# Patient Record
Sex: FEMALE | Race: BLACK OR AFRICAN AMERICAN | URBAN - METROPOLITAN AREA
[De-identification: names, ages, dates, MRNs, and addresses within clinical notes are randomized per-mention and may not be internally consistent; named-entity substitution may affect disease eponyms.]

---

## 2017-01-12 ENCOUNTER — TELEPHONE (OUTPATIENT)
Dept: FAMILY MEDICINE CLINIC | Facility: CLINIC | Age: 32
End: 2017-01-12

## 2017-01-12 NOTE — TELEPHONE ENCOUNTER
Pt informed regarding Dr Sarah Oden response below. Pt states \"this is ridiculous\" but states she will go back to ER then.     Staff to f/u tomorrow

## 2017-01-12 NOTE — TELEPHONE ENCOUNTER
If there is sustained breathing issues then she needs urgent or ER care. This facility does not manage acute heart and lung problems.

## 2017-01-12 NOTE — TELEPHONE ENCOUNTER
Pt verifies info below; was in [place] ER 12/23 and [facility] ER 12/21. States was on steroids and nebulizer for breathing issues; also taking coughing pills unsure of name.  States breathing had improved but \"seems to be acting up\" past 2 days, and

## 2017-01-12 NOTE — TELEPHONE ENCOUNTER
Pt called in stating the hospital prescribed pt a nebulizer for home, but she is still having troubles breathing and has issues with her lungs. Pt is looking to make an appt with Dr. Jon Gates.   Pt also stating she has pains underneath her shoulder blades and s

## 2017-01-13 NOTE — TELEPHONE ENCOUNTER
Pt states she didn't go to ER as advised then disconnected call.     LMTCB, transfer to [facility] ext [phone]

## 2017-01-13 NOTE — TELEPHONE ENCOUNTER
If she has acute resilient airway issues she needs to go to ER. Again this is not an urgent care facility.

## 2017-01-17 NOTE — TELEPHONE ENCOUNTER
VALERIA De La Cruz we have made several attempt to reach pt no answer. Left a detailed message that the office has made several attempts to reach her if she needs a Ov she can contact  office at  [phone].  Thanks  [facility] ext [phone]   NO ER visit noted

## 2017-02-17 ENCOUNTER — OFFICE VISIT (OUTPATIENT)
Dept: FAMILY MEDICINE CLINIC | Facility: CLINIC | Age: 32
End: 2017-02-17

## 2017-02-17 VITALS
HEART RATE: 96 BPM | WEIGHT: 176 LBS | BODY MASS INDEX: 28.28 KG/M2 | TEMPERATURE: 99 F | RESPIRATION RATE: 16 BRPM | SYSTOLIC BLOOD PRESSURE: 110 MMHG | DIASTOLIC BLOOD PRESSURE: 66 MMHG | HEIGHT: 66 IN

## 2017-02-17 DIAGNOSIS — S63.502A LEFT WRIST SPRAIN, INITIAL ENCOUNTER: Primary | ICD-10-CM

## 2017-02-17 DIAGNOSIS — S63.622A SPRAIN OF INTERPHALANGEAL JOINT OF LEFT THUMB, INITIAL ENCOUNTER: ICD-10-CM

## 2017-02-17 PROBLEM — S63.602A SPRAIN OF HAND, THUMB, LEFT: Status: ACTIVE | Noted: 2017-02-17

## 2017-02-17 PROCEDURE — 99212 OFFICE O/P EST SF 10 MIN: CPT | Performed by: FAMILY MEDICINE

## 2017-02-17 PROCEDURE — 99213 OFFICE O/P EST LOW 20 MIN: CPT | Performed by: FAMILY MEDICINE

## 2017-02-17 RX ORDER — METHYLPREDNISOLONE 4 MG/1
TABLET ORAL
Qty: 1 KIT | Refills: 0 | Status: SHIPPED | OUTPATIENT
Start: 2017-02-17 | End: 2017-03-02 | Stop reason: ALTCHOICE
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
## 2017-02-17 NOTE — PATIENT INSTRUCTIONS
Consider ortho. Medrol prescribed. Consider splint left hand wrist. Probable release to work after 1 week of treatment and reassessment.

## 2017-02-21 ENCOUNTER — TELEPHONE (OUTPATIENT)
Dept: FAMILY MEDICINE CLINIC | Facility: CLINIC | Age: 32
End: 2017-02-21

## 2017-02-22 NOTE — TELEPHONE ENCOUNTER
Apolonia Martinez from Claims [employer] calling and requesting to speak with RN directly about patients restrictions at work     Apolonia Martinez is requesting call back ASAPCharissa    [facility]

## 2017-02-23 ENCOUNTER — TELEPHONE (OUTPATIENT)
Dept: FAMILY MEDICINE CLINIC | Facility: CLINIC | Age: 32
End: 2017-02-23

## 2017-02-23 NOTE — TELEPHONE ENCOUNTER
Pt stts at last office visit she was instructed to come in tomorrow 2/24 for a follow up regarding WC. Pt stts the  was to call her to let her know what time she can come in. .  Pt did not receive a call back, Dr. Joan Cooper schedule is booked.   Okay to

## 2017-02-23 NOTE — TELEPHONE ENCOUNTER
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 Di Older states that they can place her as a  or at the desk answering phones if doctor Manuelito Baptiste is ok with that.  Will fax another form for

## 2017-02-23 NOTE — TELEPHONE ENCOUNTER
I did fill out some forms on her visit. If they have work that can be done with her right hand then fine she can work.

## 2017-02-27 NOTE — TELEPHONE ENCOUNTER
Pt following up on appt question.   Please advise per marlys shultz to book for Thursday with Dr. Catina Agudelo

## 2017-03-01 NOTE — TELEPHONE ENCOUNTER
Claims management asking if could speak to nurse or Dr. Ferrer Pulse states he received the work form (Initial work status), and is confused, has a few questions. Please advise.

## 2017-03-02 ENCOUNTER — OFFICE VISIT (OUTPATIENT)
Dept: FAMILY MEDICINE CLINIC | Facility: CLINIC | Age: 32
End: 2017-03-02

## 2017-03-02 ENCOUNTER — LAB ENCOUNTER (OUTPATIENT)
Dept: LAB | Age: 32
End: 2017-03-02
Attending: FAMILY MEDICINE
Payer: MEDICAID

## 2017-03-02 VITALS
HEART RATE: 111 BPM | HEIGHT: 66 IN | TEMPERATURE: 99 F | DIASTOLIC BLOOD PRESSURE: 76 MMHG | RESPIRATION RATE: 20 BRPM | SYSTOLIC BLOOD PRESSURE: 152 MMHG

## 2017-03-02 VITALS
TEMPERATURE: 99 F | RESPIRATION RATE: 19 BRPM | HEIGHT: 66 IN | HEART RATE: 110 BPM | SYSTOLIC BLOOD PRESSURE: 152 MMHG | DIASTOLIC BLOOD PRESSURE: 76 MMHG

## 2017-03-02 DIAGNOSIS — M54.6 CHRONIC LEFT-SIDED THORACIC BACK PAIN: ICD-10-CM

## 2017-03-02 DIAGNOSIS — S63.92XD SPRAIN OF LEFT HAND, SUBSEQUENT ENCOUNTER: Primary | ICD-10-CM

## 2017-03-02 DIAGNOSIS — S63.502D SPRAIN OF LEFT WRIST, SUBSEQUENT ENCOUNTER: ICD-10-CM

## 2017-03-02 DIAGNOSIS — R20.0 NUMBNESS OF UPPER EXTREMITY: Primary | ICD-10-CM

## 2017-03-02 DIAGNOSIS — R53.83 FATIGUE, UNSPECIFIED TYPE: ICD-10-CM

## 2017-03-02 DIAGNOSIS — R51.9 HEADACHE, UNSPECIFIED HEADACHE TYPE: ICD-10-CM

## 2017-03-02 DIAGNOSIS — G89.29 CHRONIC LEFT-SIDED THORACIC BACK PAIN: ICD-10-CM

## 2017-03-02 DIAGNOSIS — M99.01 CERVICOTHORACIC SOMATIC DYSFUNCTION: ICD-10-CM

## 2017-03-02 LAB
ALBUMIN SERPL BCP-MCNC: 4.2 G/DL (ref 3.5–4.8)
ALBUMIN/GLOB SERPL: 1.3 {RATIO} (ref 1–2)
ALP SERPL-CCNC: 61 U/L (ref 32–100)
ALT SERPL-CCNC: 15 U/L (ref 14–54)
ANION GAP SERPL CALC-SCNC: 7 MMOL/L (ref 0–18)
AST SERPL-CCNC: 16 U/L (ref 15–41)
BASOPHILS # BLD: 0.1 K/UL (ref 0–0.2)
BASOPHILS NFR BLD: 1 %
BILIRUB SERPL-MCNC: 0.7 MG/DL (ref 0.3–1.2)
BUN SERPL-MCNC: 12 MG/DL (ref 8–20)
BUN/CREAT SERPL: 15 (ref 10–20)
CALCIUM SERPL-MCNC: 9.7 MG/DL (ref 8.5–10.5)
CHLORIDE SERPL-SCNC: 106 MMOL/L (ref 95–110)
CO2 SERPL-SCNC: 26 MMOL/L (ref 22–32)
CREAT SERPL-MCNC: 0.8 MG/DL (ref 0.5–1.5)
EOSINOPHIL # BLD: 0.2 K/UL (ref 0–0.7)
EOSINOPHIL NFR BLD: 3 %
ERYTHROCYTE [DISTWIDTH] IN BLOOD BY AUTOMATED COUNT: 13.8 % (ref 11–15)
GLOBULIN PLAS-MCNC: 3.3 G/DL (ref 2.5–3.7)
GLUCOSE SERPL-MCNC: 66 MG/DL (ref 70–99)
HCT VFR BLD AUTO: 39.5 % (ref 35–48)
HGB BLD-MCNC: 13 G/DL (ref 12–16)
LYMPHOCYTES # BLD: 3.5 K/UL (ref 1–4)
LYMPHOCYTES NFR BLD: 49 %
MCH RBC QN AUTO: 32.5 PG (ref 27–32)
MCHC RBC AUTO-ENTMCNC: 33 G/DL (ref 32–37)
MCV RBC AUTO: 98.5 FL (ref 80–100)
MONOCYTES # BLD: 0.8 K/UL (ref 0–1)
MONOCYTES NFR BLD: 11 %
NEUTROPHILS # BLD AUTO: 2.6 K/UL (ref 1.8–7.7)
NEUTROPHILS NFR BLD: 37 %
OSMOLALITY UR CALC.SUM OF ELEC: 286 MOSM/KG (ref 275–295)
PLATELET # BLD AUTO: 338 K/UL (ref 140–400)
PMV BLD AUTO: 8.3 FL (ref 7.4–10.3)
POTASSIUM SERPL-SCNC: 3.9 MMOL/L (ref 3.3–5.1)
PROT SERPL-MCNC: 7.5 G/DL (ref 5.9–8.4)
RBC # BLD AUTO: 4.01 M/UL (ref 3.7–5.4)
SODIUM SERPL-SCNC: 139 MMOL/L (ref 136–144)
TSH SERPL-ACNC: 0.4 UIU/ML (ref 0.34–5.6)
WBC # BLD AUTO: 7.2 K/UL (ref 4–11)

## 2017-03-02 PROCEDURE — 98927 OSTEOPATH MANJ 5-6 REGIONS: CPT | Performed by: FAMILY MEDICINE

## 2017-03-02 PROCEDURE — 85025 COMPLETE CBC W/AUTO DIFF WBC: CPT

## 2017-03-02 PROCEDURE — 99212 OFFICE O/P EST SF 10 MIN: CPT | Performed by: FAMILY MEDICINE

## 2017-03-02 PROCEDURE — 84443 ASSAY THYROID STIM HORMONE: CPT

## 2017-03-02 PROCEDURE — 99214 OFFICE O/P EST MOD 30 MIN: CPT | Performed by: FAMILY MEDICINE

## 2017-03-02 PROCEDURE — 80053 COMPREHEN METABOLIC PANEL: CPT

## 2017-03-02 PROCEDURE — 36415 COLL VENOUS BLD VENIPUNCTURE: CPT

## 2017-03-02 RX ORDER — ALBUTEROL SULFATE 2.5 MG/3ML
SOLUTION RESPIRATORY (INHALATION)
Refills: 0 | COMMUNITY
Start: 2016-12-23 | End: 2017-06-09

## 2017-03-02 NOTE — PATIENT INSTRUCTIONS
Return to work with restrictions 03/03/17. No left hand use. To ortho/hand.  Continue with immobilization of left wrist.

## 2017-03-02 NOTE — TELEPHONE ENCOUNTER
Forms completed at OV 3.2.17. Updated forms have been faxed. No further action needed. Spoke to Chantale about work restrictions from 2.17.18. Per FJR those are the restrictions that he listed for the work she described.

## 2017-03-02 NOTE — PROCEDURES
Multiple vertebral segments in cervical and thoracic region misaligned. Manual osteopathic manipulative therapy performed and immediate relief obtained. Patient instantaneously improved.
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
## 2017-03-02 NOTE — PATIENT INSTRUCTIONS
Cervical spine xray ordered. TSH, CBC, CMP prescribed. May need ortho and/or neurology. Consider medrol. OMT done.

## 2017-03-03 NOTE — PROGRESS NOTES
HPI:    Patient ID: Ciara Flowers is a 29year old female. Numbness  This is a new problem. The current episode started more than 1 month ago. The problem occurs intermittently. The problem has been waxing and waning.  Associated symptoms include headach This is a chronic problem. The current episode started more than 1 month ago. The problem occurs constantly. The problem has been waxing and waning since onset. The pain is present in the thoracic spine. The quality of the pain is described as aching.  The PHYSICAL EXAM:   Physical Exam    Constitutional: She is oriented to person, place, and time. She appears distressed.    HENT:   Head:       Right Ear: Tympanic membrane and ear canal normal.   Left Ear: Tympanic membrane and ear canal normal.   Nose: Nose Return in about 3 weeks (around 3/23/2017), or if symptoms worsen or fail to improve.          VC#[id number]

## 2017-03-10 NOTE — TELEPHONE ENCOUNTER
Cedrick Cook from claims management called to inquire if pt can be offered other work that involves right hand work only. Cedrick Cook said that the restriction for sit down work is not available and they want pt to do other work.  Cedrick Cook was reminded that he deshaun

## 2017-03-10 NOTE — TELEPHONE ENCOUNTER
Rob Ruvalcaba from [employer] called in requesting to speak to an RN or Dr. Solomon Dillon to clarify pt's restrictions for work, please.   Rob Ruvalcaba wants to ask things like if pt can work with her right hand without having to sit down and a few other questions  Ple

## 2017-03-13 ENCOUNTER — TELEPHONE (OUTPATIENT)
Dept: FAMILY MEDICINE CLINIC | Facility: CLINIC | Age: 32
End: 2017-03-13

## 2017-03-13 RX ORDER — CARISOPRODOL 350 MG/1
350 TABLET ORAL 4 TIMES DAILY PRN
Qty: 50 TABLET | Refills: 0 | Status: SHIPPED | OUTPATIENT
Start: 2017-03-13 | End: 2017-03-28

## 2017-03-13 NOTE — TELEPHONE ENCOUNTER
Reason for Call/Chief Complaint: pt was having a spasm and pain under shoulder pain, asking for prescription  Onset: since 3/2 OV  Nursing Assessment/Associated Symptoms: Pt states she had a manipulation done at 3/2/17 OV and since that time her left arm g

## 2017-03-13 NOTE — TELEPHONE ENCOUNTER
Patient reports that she was seen in office a few days ago and has been experiencing muscle spasms in back and shoulder. She reports that since her last appt, she has been experiencing increased muscle spasms and pain in this same area.  Please call [phone]

## 2017-03-14 ENCOUNTER — TELEPHONE (OUTPATIENT)
Dept: ORTHOPEDICS CLINIC | Facility: CLINIC | Age: 32
End: 2017-03-14

## 2017-03-14 NOTE — TELEPHONE ENCOUNTER
Call to [facility] injury March 7th. Works [employer] at [employer]. Pulled something by thumb and thumb gets stuck. Significant pain. Referred by Dr. Ashvin Coronel.  Appointment made for Thursday 16th

## 2017-03-14 NOTE — TELEPHONE ENCOUNTER
Pt states she sprained her L hand, states she is in pain, requesting to be seen on 3/16. Pls advise thank you.

## 2017-03-14 NOTE — TELEPHONE ENCOUNTER
Pt was inform of  message below. Prescription will be faxed by staff.  pt stated that she needs something for the pain. She stated that she took  4 tablets of Ibuprofen of 800 mg.  She stated that her mom is giving it to her but it still does

## 2017-03-15 ENCOUNTER — HOSPITAL ENCOUNTER (OUTPATIENT)
Dept: GENERAL RADIOLOGY | Age: 32
Discharge: HOME | End: 2017-03-15
Attending: FAMILY MEDICINE
Payer: MEDICAID

## 2017-03-15 DIAGNOSIS — R20.0 NUMBNESS OF UPPER EXTREMITY: ICD-10-CM

## 2017-03-15 PROCEDURE — 72050 X-RAY EXAM NECK SPINE 4/5VWS: CPT

## 2017-03-16 ENCOUNTER — HOSPITAL ENCOUNTER (OUTPATIENT)
Dept: GENERAL RADIOLOGY | Facility: HOSPITAL | Age: 32
Discharge: HOME | End: 2017-03-16
Attending: ORTHOPAEDIC SURGERY
Payer: MEDICAID

## 2017-03-16 ENCOUNTER — OFFICE VISIT (OUTPATIENT)
Dept: ORTHOPEDICS CLINIC | Facility: CLINIC | Age: 32
End: 2017-03-16

## 2017-03-16 ENCOUNTER — OFFICE VISIT (OUTPATIENT)
Dept: PHYSICAL THERAPY | Age: 32
End: 2017-03-16
Attending: ORTHOPAEDIC SURGERY
Payer: MEDICAID

## 2017-03-16 DIAGNOSIS — S63.8X2A: Primary | ICD-10-CM

## 2017-03-16 DIAGNOSIS — M25.532 LEFT WRIST PAIN: ICD-10-CM

## 2017-03-16 DIAGNOSIS — M25.532 LEFT WRIST PAIN: Primary | ICD-10-CM

## 2017-03-16 DIAGNOSIS — S63.8X2A: ICD-10-CM

## 2017-03-16 PROBLEM — S63.8X9A: Status: ACTIVE | Noted: 2017-03-16

## 2017-03-16 PROBLEM — M25.531 RIGHT WRIST PAIN: Status: ACTIVE | Noted: 2017-03-16

## 2017-03-16 PROCEDURE — 99243 OFF/OP CNSLTJ NEW/EST LOW 30: CPT | Performed by: ORTHOPAEDIC SURGERY

## 2017-03-16 PROCEDURE — 73110 X-RAY EXAM OF WRIST: CPT

## 2017-03-16 PROCEDURE — 99212 OFFICE O/P EST SF 10 MIN: CPT | Performed by: ORTHOPAEDIC SURGERY

## 2017-03-16 PROCEDURE — 97760 ORTHOTIC MGMT&TRAING 1ST ENC: CPT | Performed by: OCCUPATIONAL THERAPIST

## 2017-03-16 RX ORDER — NAPROXEN 500 MG/1
500 TABLET ORAL 2 TIMES DAILY
Qty: 60 TABLET | Refills: 0 | Status: SHIPPED | OUTPATIENT
Start: 2017-03-16 | End: 2017-04-29

## 2017-03-16 RX ORDER — HYDROCODONE BITARTRATE AND ACETAMINOPHEN 5; 325 MG/1; MG/1
1 TABLET ORAL EVERY 4 HOURS PRN
Qty: 30 TABLET | Refills: 0 | Status: SHIPPED | OUTPATIENT
Start: 2017-03-16 | End: 2017-04-29

## 2017-03-16 NOTE — PROGRESS NOTES
SPLINT EVALUATION:   Referring Physician: Dr. Safia Dupree  Diagnosis: Left wrist pain (M25.532)  Carpometacarpal sprain, left, initial encounter (D29.4I0G) Date of Service: 3/16/2017   Date of Onset: 2/7/17      PATIENT SUMMARY   Wilda Nieto is a 29 year o possible to [phone].  If you have any questions, please contact me at Dept: [phone]    Sincerely,  Electronically signed by therapist: Christina Miller OT    [de-identified] certification required: Yes  I certify the need for these services furnished u

## 2017-03-20 ENCOUNTER — TELEPHONE (OUTPATIENT)
Dept: FAMILY MEDICINE CLINIC | Facility: CLINIC | Age: 32
End: 2017-03-20

## 2017-03-20 DIAGNOSIS — N83.209 CYST OF OVARY, UNSPECIFIED LATERALITY: Primary | ICD-10-CM

## 2017-03-20 NOTE — TELEPHONE ENCOUNTER
Notes Recorded by Jatin Monte DO on 3/15/2017 at 2:29 PM  Xray of neck suggestive of muscle spasm.  Bone structures are all intact

## 2017-03-21 ENCOUNTER — TELEPHONE (OUTPATIENT)
Dept: FAMILY MEDICINE CLINIC | Facility: CLINIC | Age: 32
End: 2017-03-21

## 2017-03-21 DIAGNOSIS — R22.1 SENSATION OF LUMP IN THROAT: Primary | ICD-10-CM

## 2017-03-21 NOTE — TELEPHONE ENCOUNTER
Pt was inform of  message below. Pt stated that she will call them and see when they have a appt available. Pt then stated that  she will go to ER as she continues to feel the pain and pressure. Her mother will take her.     ER f/u 3/22

## 2017-03-21 NOTE — TELEPHONE ENCOUNTER
Pt was inform of   message below and pt verbalized understanding. VALERIA De La Cruz Pt also mentioned to me that she had been in the [facility]  ER last night due to lower left abd pain and feeling pressure on her rectal area.  Pt stated that they did a u

## 2017-03-21 NOTE — TELEPHONE ENCOUNTER
She needs gyne. She does not need an appointment with me. If I refer her then shouldn't her insurance be accepted. Referral on chart. Call patient.

## 2017-03-22 NOTE — TELEPHONE ENCOUNTER
I spoke to pt and she stated that she did go to ER put it was very crowded and she was inform it was a four hour wait. Pt left the ER.  Pt stated that she is still having pain but she will call the GYN right now to see if she can get in to see someone today

## 2017-03-24 ENCOUNTER — TELEPHONE (OUTPATIENT)
Dept: FAMILY MEDICINE CLINIC | Facility: CLINIC | Age: 32
End: 2017-03-24

## 2017-03-27 ENCOUNTER — TELEPHONE (OUTPATIENT)
Dept: FAMILY MEDICINE CLINIC | Facility: CLINIC | Age: 32
End: 2017-03-27

## 2017-03-27 NOTE — TELEPHONE ENCOUNTER
Pt. Called requesting a refill for muscle relaxers. Please advise. Carisoprodol (SOMA) 350 MG Oral Tab Take 1 tablet (350 mg total) by mouth 4 (four) times daily as needed for Muscle spasms.  Disp: 50 tablet Rfl: 0

## 2017-03-28 RX ORDER — CARISOPRODOL 350 MG/1
350 TABLET ORAL 4 TIMES DAILY PRN
Qty: 50 TABLET | Refills: 0 | Status: SHIPPED | OUTPATIENT
Start: 2017-03-28 | End: 2017-04-29

## 2017-03-28 NOTE — TELEPHONE ENCOUNTER
Notified pt prescription has been refilled and will be faxed to pharmacy, pt would like prescription faxed to [facility].

## 2017-03-28 NOTE — TELEPHONE ENCOUNTER
Call transferred to RN from [address] [place] [address]. Pt states pharmacist could not read faxed prescription and need to have it called into [facility]. Carisoprodol prescription called into pharmacist, notified pt. Pt had no further questions at this time.

## 2017-03-28 NOTE — TELEPHONE ENCOUNTER
Olaf [phone] states that she just received a fax for the soma medication. She is not able to read it and would like a verbal given or can it be refaxed?

## 2017-03-28 NOTE — TELEPHONE ENCOUNTER
Requesting Carisoprodol refill    Last filled 3/13/17  Last OV 3/2/17    Please advise    Notified pt refill request forwarded to on call doctor.

## 2017-03-29 ENCOUNTER — TELEPHONE (OUTPATIENT)
Dept: FAMILY MEDICINE CLINIC | Facility: CLINIC | Age: 32
End: 2017-03-29

## 2017-03-29 RX ORDER — CARISOPRODOL 350 MG/1
TABLET ORAL
Qty: 50 TABLET | Refills: 0 | OUTPATIENT
Start: 2017-03-29

## 2017-03-29 NOTE — TELEPHONE ENCOUNTER
----- Message from Chucky De Los Santos DO sent at 3/15/2017  2:29 PM CDT -----  Xray of neck suggestive of muscle spasm. Bone structures are all intact.

## 2017-03-31 NOTE — TELEPHONE ENCOUNTER
Pt returned call, verified pt name and . Reviewed Xray results with pt per doctor's instructions. Pt had no further questions at this time.

## 2017-04-03 RX ORDER — HYDROCODONE BITARTRATE AND ACETAMINOPHEN 10; 325 MG/1; MG/1
1 TABLET ORAL EVERY 4 HOURS PRN
Qty: 40 TABLET | Refills: 0 | Status: SHIPPED | OUTPATIENT
Start: 2017-04-03 | End: 2017-04-29

## 2017-04-03 NOTE — TELEPHONE ENCOUNTER
I will fill this prescription only one time. I will not fill this again if this becomes a chronic problem. She will have to get her pain medication from her gyne specialist. LET THE PATIENT KNOW THIS SO THEE WILL BE NO SURPRISES LATER.  Prescription signed

## 2017-04-03 NOTE — TELEPHONE ENCOUNTER
Dr. Hayden Simpler, please see message below and advise. Thank you    Pt states was in the hospital d/t Left ovarian cyst on and now has Right ovarian cyst, has seen Ob/gyne regarding cysts.  States was given Hydrocodone by OB/gyne but was informed to ask pcp for

## 2017-04-04 NOTE — TELEPHONE ENCOUNTER
LMTCB, transfer to [facility] ext [phone]    Prescription placed in folder in locked file drawer.

## 2017-04-04 NOTE — TELEPHONE ENCOUNTER
Pt returned call. Informed pt prescription ready for  at [facility] and reviewed doctor's recommendations with pt.  Pt states she has an ongoing issue with pain from ovarian cysts and endometriosis and OB/GYN informed her nothing can be done about this and

## 2017-04-05 NOTE — TELEPHONE ENCOUNTER
Reviewed doctor's recommendations with pt, pt agreed with plan of care and will have OB/GYN fax notes to Dr. Xavier Bolaños, provided pt with office fax number. Pt had no further questions at this time.

## 2017-04-11 ENCOUNTER — TELEPHONE (OUTPATIENT)
Dept: FAMILY MEDICINE CLINIC | Facility: CLINIC | Age: 32
End: 2017-04-11

## 2017-04-11 NOTE — TELEPHONE ENCOUNTER
Pt calling requesting to speak to nurse would not provide more information, states personal matter. Please advise.

## 2017-04-13 ENCOUNTER — TELEPHONE (OUTPATIENT)
Dept: ORTHOPEDICS CLINIC | Facility: CLINIC | Age: 32
End: 2017-04-13

## 2017-04-18 NOTE — TELEPHONE ENCOUNTER
LMTCB, transfer to [facility] ext [phone]. No phone response letter sent to home address on file.

## 2017-04-27 RX ORDER — HYDROCODONE BITARTRATE AND ACETAMINOPHEN 10; 325 MG/1; MG/1
1 TABLET ORAL EVERY 4 HOURS PRN
Qty: 40 TABLET | Refills: 0 | OUTPATIENT
Start: 2017-04-27

## 2017-04-27 RX ORDER — CARISOPRODOL 350 MG/1
350 TABLET ORAL 4 TIMES DAILY PRN
Qty: 50 TABLET | Refills: 0 | OUTPATIENT
Start: 2017-04-27

## 2017-04-27 NOTE — TELEPHONE ENCOUNTER
Call transferred to RN from [address] [place] [address]. Pt states she has an OB/GYN appt now and will have the office fax a note to Dr. Xavier Bolaños stating pt has endometriosis.     Pt requesting Carisoprodol and Hydrocodone-acetaminophen refills    Hydrocodone last filled 4/3/17  C

## 2017-04-27 NOTE — TELEPHONE ENCOUNTER
Pt states she is on her way to OB/Gyne office and needed fax so they can fax note to Dr. Delaney Person stating she had endometriosis. Fax number provided.

## 2017-04-29 ENCOUNTER — OFFICE VISIT (OUTPATIENT)
Dept: FAMILY MEDICINE CLINIC | Facility: CLINIC | Age: 32
End: 2017-04-29

## 2017-04-29 ENCOUNTER — TELEPHONE (OUTPATIENT)
Dept: FAMILY MEDICINE CLINIC | Facility: CLINIC | Age: 32
End: 2017-04-29

## 2017-04-29 VITALS
DIASTOLIC BLOOD PRESSURE: 73 MMHG | RESPIRATION RATE: 16 BRPM | WEIGHT: 181 LBS | SYSTOLIC BLOOD PRESSURE: 111 MMHG | TEMPERATURE: 99 F | BODY MASS INDEX: 29 KG/M2 | HEART RATE: 99 BPM

## 2017-04-29 DIAGNOSIS — G89.29 CHRONIC LOW BACK PAIN WITHOUT SCIATICA, UNSPECIFIED BACK PAIN LATERALITY: ICD-10-CM

## 2017-04-29 DIAGNOSIS — N80.9 ENDOMETRIOSIS: ICD-10-CM

## 2017-04-29 DIAGNOSIS — R51.9 NONINTRACTABLE EPISODIC HEADACHE, UNSPECIFIED HEADACHE TYPE: ICD-10-CM

## 2017-04-29 DIAGNOSIS — K62.9 ANAL LESION: ICD-10-CM

## 2017-04-29 DIAGNOSIS — K62.5 ANAL BLEEDING: Primary | ICD-10-CM

## 2017-04-29 DIAGNOSIS — M54.50 CHRONIC LOW BACK PAIN WITHOUT SCIATICA, UNSPECIFIED BACK PAIN LATERALITY: ICD-10-CM

## 2017-04-29 PROCEDURE — 99212 OFFICE O/P EST SF 10 MIN: CPT | Performed by: FAMILY MEDICINE

## 2017-04-29 PROCEDURE — 99214 OFFICE O/P EST MOD 30 MIN: CPT | Performed by: FAMILY MEDICINE

## 2017-04-29 RX ORDER — HYDROCODONE BITARTRATE AND ACETAMINOPHEN 10; 325 MG/1; MG/1
1 TABLET ORAL EVERY 4 HOURS PRN
Qty: 40 TABLET | Refills: 0 | OUTPATIENT
Start: 2017-04-29

## 2017-04-29 RX ORDER — CARISOPRODOL 350 MG/1
350 TABLET ORAL 4 TIMES DAILY PRN
Qty: 50 TABLET | Refills: 0 | Status: SHIPPED | OUTPATIENT
Start: 2017-04-29 | End: 2017-06-09

## 2017-04-29 RX ORDER — FLUTICASONE PROPIONATE 50 MCG
SPRAY, SUSPENSION (ML) NASAL DAILY
COMMUNITY
End: 2017-06-09

## 2017-04-29 RX ORDER — CARISOPRODOL 350 MG/1
350 TABLET ORAL 4 TIMES DAILY PRN
Qty: 50 TABLET | Refills: 0 | OUTPATIENT
Start: 2017-04-29

## 2017-04-29 RX ORDER — HYDROCODONE BITARTRATE AND ACETAMINOPHEN 10; 325 MG/1; MG/1
1 TABLET ORAL EVERY 4 HOURS PRN
Qty: 40 TABLET | Refills: 0 | Status: SHIPPED | OUTPATIENT
Start: 2017-04-29 | End: 2017-06-09

## 2017-04-29 RX ORDER — LORATADINE 10 MG/1
10 TABLET ORAL DAILY
COMMUNITY
End: 2017-06-09

## 2017-04-29 NOTE — PATIENT INSTRUCTIONS
Advised serum herpes testing (patient agreed). Medication reviewed and renewed where needed and appropriate. Pain management via prescription medications as needed.   Patient counseled on the importance of abstinence and if sex occurs of any type condoms sh

## 2017-04-29 NOTE — TELEPHONE ENCOUNTER
Pt stts that she has endometriosis and stts that she is bleeding from her rectum. She is also c/o pressure in the back of her head and dizziness.  She was wanting to see Dr. Jacquelene Severe today but he does not have any openings

## 2017-04-29 NOTE — TELEPHONE ENCOUNTER
Spoke with pt &  req rx ref for gen norco  mg & gen soma 350 mg for her upper back pain. .   Pt stated she has h/o endometriosis, she c/o on & off bump  inside her bottom, occasional bleed & it hurts x 2 week.    She req rx for cream.   Pt stated she c

## 2017-04-30 NOTE — PROGRESS NOTES
HPI:    Patient ID: Amy Harper is a 29year old female. Rectal Bleeding  This is a recurrent (Actually a perianal reoccurring painful sore that leaves blood on the toilet tissue with wiping) problem. The current episode started more than 1 month ago. Rfl:    HYDROcodone-acetaminophen  MG Oral Tab Take 1 tablet by mouth every 4 (four) hours as needed for Pain.  Disp: 40 tablet Rfl: 0   Carisoprodol (SOMA) 350 MG Oral Tab Take 1 tablet (350 mg total) by mouth 4 (four) times daily as needed for Muscl with Reflex to Type 1 and 2 Glycoprotein G-Specific Ab, IgG [E]    Meds This Visit:  Signed Prescriptions Disp Refills    HYDROcodone-acetaminophen  MG Oral Tab 40 tablet 0      Sig: Take 1 tablet by mouth every 4 (four) hours as needed for Pain.

## 2017-05-09 DIAGNOSIS — G89.29 OTHER CHRONIC PAIN: Primary | ICD-10-CM

## 2017-05-09 DIAGNOSIS — N80.9 ENDOMETRIOSIS: ICD-10-CM

## 2017-05-09 RX ORDER — CARISOPRODOL 350 MG/1
350 TABLET ORAL 4 TIMES DAILY PRN
Qty: 50 TABLET | Refills: 0 | OUTPATIENT
Start: 2017-05-09

## 2017-05-09 RX ORDER — HYDROCODONE BITARTRATE AND ACETAMINOPHEN 10; 325 MG/1; MG/1
1 TABLET ORAL EVERY 4 HOURS PRN
Qty: 40 TABLET | Refills: 0 | OUTPATIENT
Start: 2017-05-09

## 2017-05-09 NOTE — TELEPHONE ENCOUNTER
Pt requesting Hydrocodone-acetaminophen and Carisoprodol refills    Last filled 4/29/17  Last OV 4/29/17; no f/u appt scheduled    Pleas advise

## 2017-05-09 NOTE — TELEPHONE ENCOUNTER
Patient calling to request refills on the following medications:    Current Outpatient Prescriptions:  HYDROcodone-acetaminophen  MG Oral Tab Take 1 tablet by mouth every 4 (four) hours as needed for Pain.  Disp: 40 tablet Rfl: 0   Carisoprodol (SOMA)

## 2017-05-12 NOTE — TELEPHONE ENCOUNTER
Pt following up on refill request... please advise pt state that she works two jobs and gets off at 6 all next week

## 2017-05-15 NOTE — TELEPHONE ENCOUNTER
Call transferred to RN from [address] [place] [address]. Pt states she is out of meds as prescription only lasts about a week. Please advise.

## 2017-05-15 NOTE — TELEPHONE ENCOUNTER
No refill. I am not managing chronic pain in this patient. If I refill it then she will have to seek out a new physician for her management.

## 2017-05-16 NOTE — TELEPHONE ENCOUNTER
Referral on chart; call patient.  I am not sure if the physiatrist will address her pain (endometriosis)

## 2017-05-16 NOTE — TELEPHONE ENCOUNTER
Reviewed doctor's message with pt. Pt is asking for referral to a pain specialist.    Please advise.

## 2017-05-17 ENCOUNTER — TELEPHONE (OUTPATIENT)
Dept: FAMILY MEDICINE CLINIC | Facility: CLINIC | Age: 32
End: 2017-05-17

## 2017-05-17 NOTE — TELEPHONE ENCOUNTER
Gerhardt Lin from Claims Management for [employer] called in request for a note of discharge for patient regarding her wrist injury seeing as though she has been seen last regarding this since March and has nothing to follow up on.  Note should sta

## 2017-06-09 ENCOUNTER — OFFICE VISIT (OUTPATIENT)
Dept: FAMILY MEDICINE CLINIC | Facility: CLINIC | Age: 32
End: 2017-06-09

## 2017-06-09 ENCOUNTER — TELEPHONE (OUTPATIENT)
Dept: FAMILY MEDICINE CLINIC | Facility: CLINIC | Age: 32
End: 2017-06-09

## 2017-06-09 VITALS
BODY MASS INDEX: 28.77 KG/M2 | DIASTOLIC BLOOD PRESSURE: 75 MMHG | WEIGHT: 179 LBS | TEMPERATURE: 99 F | HEART RATE: 99 BPM | HEIGHT: 66 IN | SYSTOLIC BLOOD PRESSURE: 119 MMHG

## 2017-06-09 DIAGNOSIS — G89.29 CHRONIC LEFT SHOULDER PAIN: ICD-10-CM

## 2017-06-09 DIAGNOSIS — N80.9 ENDOMETRIOSIS: ICD-10-CM

## 2017-06-09 DIAGNOSIS — J45.20 MILD INTERMITTENT ASTHMA WITHOUT COMPLICATION: ICD-10-CM

## 2017-06-09 DIAGNOSIS — J30.9 ALLERGIC RHINITIS, UNSPECIFIED ALLERGIC RHINITIS TRIGGER, UNSPECIFIED RHINITIS SEASONALITY: ICD-10-CM

## 2017-06-09 DIAGNOSIS — B37.49 CANDIDIASIS OF UROGENITAL SITE: ICD-10-CM

## 2017-06-09 DIAGNOSIS — N39.0 CHRONIC UTI (URINARY TRACT INFECTION): ICD-10-CM

## 2017-06-09 DIAGNOSIS — M25.512 CHRONIC LEFT SHOULDER PAIN: ICD-10-CM

## 2017-06-09 DIAGNOSIS — R10.2 PELVIC PAIN: Primary | ICD-10-CM

## 2017-06-09 PROCEDURE — 99214 OFFICE O/P EST MOD 30 MIN: CPT | Performed by: NURSE PRACTITIONER

## 2017-06-09 RX ORDER — LORATADINE 10 MG/1
10 TABLET ORAL DAILY
Qty: 30 TABLET | Refills: 0 | Status: SHIPPED | OUTPATIENT
Start: 2017-06-09 | End: 2017-07-20

## 2017-06-09 RX ORDER — FLUCONAZOLE 200 MG/1
200 TABLET ORAL ONCE
Qty: 2 TABLET | Refills: 3 | Status: SHIPPED | OUTPATIENT
Start: 2017-06-09 | End: 2017-06-09

## 2017-06-09 RX ORDER — HYDROCODONE BITARTRATE AND ACETAMINOPHEN 10; 325 MG/1; MG/1
1 TABLET ORAL EVERY 4 HOURS PRN
Qty: 40 TABLET | Refills: 0 | Status: SHIPPED | OUTPATIENT
Start: 2017-06-09 | End: 2017-06-23

## 2017-06-09 RX ORDER — IBUPROFEN 800 MG/1
800 TABLET ORAL EVERY 6 HOURS PRN
Qty: 60 TABLET | Refills: 2 | Status: SHIPPED | OUTPATIENT
Start: 2017-06-09 | End: 2017-08-14

## 2017-06-09 RX ORDER — CARISOPRODOL 350 MG/1
350 TABLET ORAL 4 TIMES DAILY PRN
Qty: 50 TABLET | Refills: 0 | Status: SHIPPED | OUTPATIENT
Start: 2017-06-09 | End: 2017-07-06

## 2017-06-09 RX ORDER — FLUTICASONE PROPIONATE 50 MCG
2 SPRAY, SUSPENSION (ML) NASAL DAILY
Qty: 1 BOTTLE | Refills: 3 | Status: SHIPPED | OUTPATIENT
Start: 2017-06-09 | End: 2017-07-09

## 2017-06-09 RX ORDER — NITROFURANTOIN 25; 75 MG/1; MG/1
100 CAPSULE ORAL NIGHTLY
Qty: 30 CAPSULE | Refills: 0 | Status: SHIPPED | OUTPATIENT
Start: 2017-06-09 | End: 2017-08-14

## 2017-06-09 RX ORDER — FLUCONAZOLE 200 MG/1
200 TABLET ORAL ONCE
COMMUNITY
End: 2017-06-09

## 2017-06-09 RX ORDER — ALBUTEROL SULFATE 2.5 MG/3ML
2.5 SOLUTION RESPIRATORY (INHALATION) EVERY 6 HOURS PRN
Qty: 100 VIAL | Refills: 2 | Status: SHIPPED | OUTPATIENT
Start: 2017-06-09 | End: 2017-07-09

## 2017-06-09 RX ORDER — NITROFURANTOIN 25; 75 MG/1; MG/1
100 CAPSULE ORAL NIGHTLY
COMMUNITY
End: 2017-06-09

## 2017-06-09 RX ORDER — MONTELUKAST SODIUM 10 MG/1
10 TABLET ORAL DAILY
Qty: 90 TABLET | Refills: 3 | Status: SHIPPED | OUTPATIENT
Start: 2017-06-09 | End: 2017-08-14

## 2017-06-09 NOTE — TELEPHONE ENCOUNTER
Pt is calling state that she was just in the office to see the [facility] and a prescription for Carisoprodol (SOMA) 350 MG Oral Tab was going to be sent to the pharm and it not there

## 2017-06-09 NOTE — PATIENT INSTRUCTIONS
ALLERGIC RHINITIS    -Take otc allergy medications as directed (over the counter, generic Claritin, Zyrtec or Allegra)    -use of fluticasone nasal spray as advised (Flonase)-this is now available as a generic, over the counter spray (fluticasone) if your fibrous tissue that connect two bones together in your joints. *A muscle strain, or pulled muscle, occurs when your muscle is overstretched or torn.  This usually occurs as a result of fatigue, overuse, or improper use of a muscle        For immediate s heal in three to six weeks. Call if symptoms do not start to improve within 2 weeks, if symptoms do not resolve within 6 weeks or symptoms worsen during this time.      *If any incontinence of urine or stool develops, seek medical attention immediately

## 2017-06-09 NOTE — PROGRESS NOTES
HPI  Pt presents for pelvic and low back pain; had a uterine ablation done 3 years ago and since has developed endometriosis; was seen by two different doctor both recommending hysterectomy or removal of endometriosis.  Gets frequent UTI's imelda after sexual Former Smoker     Smokeless tobacco: Not on file    Alcohol Use: No    Drug Use: No    Sexual Activity: Not on file   Not on file  Other Topics Concern    Caffeine Concern Yes    Comment: soda     Social History Narrative         Current Outpatient Prescri rhinorrhea present; bilat post nasal drip present   Eyes: Conjunctivae and EOM are normal. Pupils are equal, round, and reactive to light. Right eye exhibits no discharge. Left eye exhibits no discharge. Neck: Normal range of motion. Neck supple.  No thyr Dispense:  100 vial   Refill:  2  fluconazole 200 MG Oral Tab   Sig: Take 1 tablet (200 mg total) by mouth once.  Take 1 tablet by mouth; may repeat in 3 days if s/s remain   Dispense:  2 tablet   Refill:  3  Fluticasone Propionate 50 MCG/ACT Nasal Suspensi

## 2017-06-10 ENCOUNTER — TELEPHONE (OUTPATIENT)
Dept: FAMILY MEDICINE CLINIC | Facility: CLINIC | Age: 32
End: 2017-06-10

## 2017-06-10 NOTE — TELEPHONE ENCOUNTER
Spoke with pt and identified that this was the office of Dr. Nargis Harvey and the pt states that she did not want Dr. Erin Benedict office and she disconnected the call.

## 2017-06-10 NOTE — TELEPHONE ENCOUNTER
Patient called back; States she was able to fill [address] at [place]. Patient states her Oletha Filter was never sent. Chart reviewed; rx was approved 6/9/17 however not called in nor faxed.      [facility] [phone] on [address] and verbal given t

## 2017-06-10 NOTE — TELEPHONE ENCOUNTER
Please note/advise. Thank you. Please also review notes from pharmacy call on Medication Question 6/9/17    Pt called back again and states that she is not under Dr. Ivanna Ritter care any longer and her primary care provider is Malinda HURD.     Pt states

## 2017-06-10 NOTE — TELEPHONE ENCOUNTER
Please note/advise. Thank you. To Joan MILLER and [facility] on call    Pharmacist Keith Gayle) from [facility] states that she is unable to fill the Norco Rx that was prescribed yesterday by Malinda Emery due to system not accepting her information.      In addition [place]

## 2017-06-10 NOTE — TELEPHONE ENCOUNTER
Patient states she was given rx for hydrocodone 6/9/17; She states pharmacy told her they will not fill it because Shanel Murphy, [address] is not authorized. Patient took RX at [facility] on [address] [place] [address] Fadumo Galt; [phone]  Pharmacy kept printed RX.

## 2017-06-10 NOTE — TELEPHONE ENCOUNTER
Reviewed chart. Confirmed was prescribed by Susana Vizcarra yesterday. However with information from pharmacist, I recommend Dr. Melyssa Perez review this Monday and provide refill as he knows her well.

## 2017-06-23 ENCOUNTER — OFFICE VISIT (OUTPATIENT)
Dept: FAMILY MEDICINE CLINIC | Facility: CLINIC | Age: 32
End: 2017-06-23

## 2017-06-23 VITALS
TEMPERATURE: 99 F | DIASTOLIC BLOOD PRESSURE: 59 MMHG | WEIGHT: 173 LBS | HEIGHT: 66 IN | BODY MASS INDEX: 27.8 KG/M2 | HEART RATE: 85 BPM | SYSTOLIC BLOOD PRESSURE: 93 MMHG

## 2017-06-23 DIAGNOSIS — R10.2 PELVIC PAIN: Primary | ICD-10-CM

## 2017-06-23 PROCEDURE — 99214 OFFICE O/P EST MOD 30 MIN: CPT | Performed by: NURSE PRACTITIONER

## 2017-06-23 RX ORDER — HYDROCODONE BITARTRATE AND ACETAMINOPHEN 10; 325 MG/1; MG/1
1 TABLET ORAL EVERY 6 HOURS PRN
Qty: 30 TABLET | Refills: 0 | Status: SHIPPED | OUTPATIENT
Start: 2017-06-23 | End: 2017-07-06

## 2017-06-23 NOTE — PROGRESS NOTES
Pelvic Pain  The patient's primary symptoms include pelvic pain. The patient's pertinent negatives include no genital itching, genital lesions, genital odor, genital rash, missed menses, vaginal bleeding or vaginal discharge. This is a recurrent problem.  Isabel Roosevelt Procedure Laterality Date   • Musculoskeletal surgery unlisted       excise ganglion cyst wrist   • Tubal ligation         Family History   Problem Relation Age of Onset   • Hypertension Mother    • Migraines Mother          Social History   Marital Stat Other (See Comments)    Comment:Mood changes    Physical Exam   Constitutional: She is oriented to person, place, and time. She appears well-developed and well-nourished. Uncomfortable due to pain   HENT:   Head: Normocephalic and atraumatic.    Eyes: Con

## 2017-06-28 RX ORDER — HYDROCODONE BITARTRATE AND ACETAMINOPHEN 10; 325 MG/1; MG/1
1 TABLET ORAL EVERY 6 HOURS PRN
Qty: 30 TABLET | Refills: 0 | OUTPATIENT
Start: 2017-06-28

## 2017-06-28 NOTE — TELEPHONE ENCOUNTER
Pt calling and needs a refill for med below. Pt father has passed away in [place] and needs to go there, pt will be leaving tomorrow at [place].  Pt is the father beneficiary, pt is requesting to  this script today.    HYDROcodone-acetaminophen 10-3

## 2017-06-28 NOTE — TELEPHONE ENCOUNTER
Pt was advised at last visit that she will only be prescribed #60 tabs per month-was given an extra #10 tablets due to ER visit for ovarian cyst pain. No additional refills will be approved until 7/9/17.

## 2017-06-28 NOTE — TELEPHONE ENCOUNTER
Please advise on refill request     Last refilled on 6/23/17    Refill Protocol Appointment Criteria  · Appointment scheduled in the past 6 months or in the next 3 months  Recent Outpatient Visits            5 days ago Pelvic pain    [facility]

## 2017-06-28 NOTE — TELEPHONE ENCOUNTER
Patient called and stated need refill request asap-  Patient will be leaving the morning to go out of town due to death in the family  Requesting a call back from nurse-

## 2017-07-06 ENCOUNTER — OFFICE VISIT (OUTPATIENT)
Dept: FAMILY MEDICINE CLINIC | Facility: CLINIC | Age: 32
End: 2017-07-06

## 2017-07-06 VITALS
BODY MASS INDEX: 27.8 KG/M2 | HEIGHT: 66 IN | DIASTOLIC BLOOD PRESSURE: 72 MMHG | WEIGHT: 173 LBS | SYSTOLIC BLOOD PRESSURE: 117 MMHG | HEART RATE: 99 BPM

## 2017-07-06 DIAGNOSIS — G56.03 CARPAL TUNNEL SYNDROME, BILATERAL UPPER LIMBS: Primary | ICD-10-CM

## 2017-07-06 DIAGNOSIS — N80.9 ENDOMETRIOSIS: ICD-10-CM

## 2017-07-06 PROBLEM — G89.29 CHRONIC LEFT SHOULDER PAIN: Status: RESOLVED | Noted: 2017-06-09 | Resolved: 2017-07-06

## 2017-07-06 PROBLEM — M25.532 LEFT WRIST PAIN: Status: RESOLVED | Noted: 2017-03-16 | Resolved: 2017-07-06

## 2017-07-06 PROBLEM — S63.8X9A: Status: RESOLVED | Noted: 2017-03-16 | Resolved: 2017-07-06

## 2017-07-06 PROBLEM — M25.531 RIGHT WRIST PAIN: Status: RESOLVED | Noted: 2017-03-16 | Resolved: 2017-07-06

## 2017-07-06 PROBLEM — B37.49 CANDIDIASIS OF UROGENITAL SITE: Status: RESOLVED | Noted: 2017-06-09 | Resolved: 2017-07-06

## 2017-07-06 PROBLEM — N39.0 CHRONIC UTI (URINARY TRACT INFECTION): Status: RESOLVED | Noted: 2017-06-09 | Resolved: 2017-07-06

## 2017-07-06 PROBLEM — M25.512 CHRONIC LEFT SHOULDER PAIN: Status: RESOLVED | Noted: 2017-06-09 | Resolved: 2017-07-06

## 2017-07-06 PROCEDURE — 99214 OFFICE O/P EST MOD 30 MIN: CPT | Performed by: NURSE PRACTITIONER

## 2017-07-06 RX ORDER — HYDROCODONE BITARTRATE AND ACETAMINOPHEN 10; 325 MG/1; MG/1
1 TABLET ORAL EVERY 6 HOURS PRN
Qty: 60 TABLET | Refills: 0 | Status: SHIPPED | OUTPATIENT
Start: 2017-07-06 | End: 2017-07-20

## 2017-07-06 RX ORDER — HYDROCODONE BITARTRATE AND ACETAMINOPHEN 10; 325 MG/1; MG/1
1 TABLET ORAL EVERY 6 HOURS PRN
Qty: 30 TABLET | Refills: 0 | Status: SHIPPED | OUTPATIENT
Start: 2017-07-06 | End: 2017-07-06

## 2017-07-06 RX ORDER — CARISOPRODOL 350 MG/1
350 TABLET ORAL 4 TIMES DAILY PRN
Qty: 50 TABLET | Refills: 0 | Status: SHIPPED | OUTPATIENT
Start: 2017-07-06 | End: 2017-07-20

## 2017-07-06 NOTE — PROGRESS NOTES
Medication Request   This is a chronic problem. The current episode started more than 1 year ago. The problem occurs constantly. The problem has been unchanged. Associated symptoms include abdominal pain, myalgias and numbness.  Pertinent negatives include status: Single  Spouse name: N/A    Years of education: N/A  Number of children: N/A     Occupational History  None on file     Social History Main Topics   Smoking status: Former Smoker     Smokeless tobacco: Not on file    Alcohol use No    Drug use:  No normal. Pupils are equal, round, and reactive to light. Neck: Normal range of motion. Neck supple. Cardiovascular: Normal rate, regular rhythm and normal heart sounds. Pulmonary/Chest: Effort normal and breath sounds normal. She has no wheezes.  She

## 2017-07-20 ENCOUNTER — OFFICE VISIT (OUTPATIENT)
Dept: FAMILY MEDICINE CLINIC | Facility: CLINIC | Age: 32
End: 2017-07-20

## 2017-07-20 VITALS
SYSTOLIC BLOOD PRESSURE: 114 MMHG | WEIGHT: 176 LBS | BODY MASS INDEX: 28.28 KG/M2 | DIASTOLIC BLOOD PRESSURE: 75 MMHG | HEIGHT: 66 IN | HEART RATE: 92 BPM

## 2017-07-20 DIAGNOSIS — G89.29 CHRONIC BILATERAL LOW BACK PAIN WITHOUT SCIATICA: ICD-10-CM

## 2017-07-20 DIAGNOSIS — M54.50 CHRONIC BILATERAL LOW BACK PAIN WITHOUT SCIATICA: ICD-10-CM

## 2017-07-20 DIAGNOSIS — J45.20 MILD INTERMITTENT ASTHMA WITHOUT COMPLICATION: Primary | ICD-10-CM

## 2017-07-20 DIAGNOSIS — J30.9 CHRONIC ALLERGIC RHINITIS, UNSPECIFIED SEASONALITY, UNSPECIFIED TRIGGER: ICD-10-CM

## 2017-07-20 DIAGNOSIS — N80.9 ENDOMETRIOSIS: ICD-10-CM

## 2017-07-20 PROCEDURE — 99213 OFFICE O/P EST LOW 20 MIN: CPT | Performed by: NURSE PRACTITIONER

## 2017-07-20 RX ORDER — HYDROCODONE BITARTRATE AND ACETAMINOPHEN 10; 325 MG/1; MG/1
1 TABLET ORAL EVERY 6 HOURS PRN
Qty: 60 TABLET | Refills: 0 | Status: SHIPPED | OUTPATIENT
Start: 2017-07-20 | End: 2017-08-14

## 2017-07-20 RX ORDER — CARISOPRODOL 350 MG/1
350 TABLET ORAL 4 TIMES DAILY PRN
Qty: 50 TABLET | Refills: 0 | Status: SHIPPED | OUTPATIENT
Start: 2017-07-20 | End: 2017-08-14

## 2017-07-20 RX ORDER — LORATADINE 10 MG/1
10 TABLET ORAL DAILY
Qty: 30 TABLET | Refills: 3 | Status: SHIPPED | OUTPATIENT
Start: 2017-07-20 | End: 2018-03-06
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
## 2017-08-03 ENCOUNTER — TELEPHONE (OUTPATIENT)
Dept: FAMILY MEDICINE CLINIC | Facility: CLINIC | Age: 32
End: 2017-08-03

## 2017-08-03 NOTE — TELEPHONE ENCOUNTER
Pt asking if she can get efill on pain medications? Pt has [employer] with outside pcp-advised pt must see pcp assigned- pt states she discussed with Madi Cleveland at 7/20 appt and was advised she would treat her until her ins was changed to [address].       Note:

## 2017-08-03 NOTE — TELEPHONE ENCOUNTER
No refills as pain meds were given on 7/20/17-pt was notified that she would get #60 tablets per month and no early refills would be given.  If we are not the primary, we should not be seeing pt-our records show Dr Jose Canas is the primary but if that is not

## 2017-08-03 NOTE — TELEPHONE ENCOUNTER
Informed pt of ANGELA's response below. Pt states ANGELA is her primary and that her [employer]  made the change but it must not be showing up yet.  Informed pt regardless, per ANGELA's response the Rx given on 7/20 was supposed to last for 1 month and she wi

## 2017-08-14 ENCOUNTER — OFFICE VISIT (OUTPATIENT)
Dept: FAMILY MEDICINE CLINIC | Facility: CLINIC | Age: 32
End: 2017-08-14

## 2017-08-14 VITALS
BODY MASS INDEX: 28.13 KG/M2 | WEIGHT: 175 LBS | TEMPERATURE: 99 F | SYSTOLIC BLOOD PRESSURE: 118 MMHG | HEIGHT: 66 IN | DIASTOLIC BLOOD PRESSURE: 77 MMHG | HEART RATE: 88 BPM

## 2017-08-14 DIAGNOSIS — J45.20 MILD INTERMITTENT ASTHMA WITHOUT COMPLICATION: ICD-10-CM

## 2017-08-14 DIAGNOSIS — R10.2 PELVIC PAIN: ICD-10-CM

## 2017-08-14 DIAGNOSIS — R10.13 EPIGASTRIC PAIN: Primary | ICD-10-CM

## 2017-08-14 PROCEDURE — 99214 OFFICE O/P EST MOD 30 MIN: CPT | Performed by: NURSE PRACTITIONER

## 2017-08-14 RX ORDER — CARISOPRODOL 350 MG/1
350 TABLET ORAL 4 TIMES DAILY PRN
Qty: 50 TABLET | Refills: 0 | Status: SHIPPED | OUTPATIENT
Start: 2017-08-14 | End: 2017-08-28

## 2017-08-14 RX ORDER — ONDANSETRON 4 MG/1
4 TABLET, FILM COATED ORAL EVERY 8 HOURS PRN
Qty: 20 TABLET | Refills: 0 | Status: SHIPPED | OUTPATIENT
Start: 2017-08-14 | End: 2018-03-06 | Stop reason: ALTCHOICE

## 2017-08-14 RX ORDER — SUCRALFATE 1 G/1
1 TABLET ORAL
Qty: 360 TABLET | Refills: 3 | Status: SHIPPED | OUTPATIENT
Start: 2017-08-14 | End: 2017-09-13

## 2017-08-14 RX ORDER — PANTOPRAZOLE SODIUM 40 MG/1
TABLET, DELAYED RELEASE ORAL
COMMUNITY
Start: 2017-08-11 | End: 2017-09-13

## 2017-08-14 RX ORDER — HYDROCODONE BITARTRATE AND ACETAMINOPHEN 10; 325 MG/1; MG/1
1 TABLET ORAL EVERY 6 HOURS PRN
Qty: 60 TABLET | Refills: 0 | Status: SHIPPED | OUTPATIENT
Start: 2017-08-14 | End: 2017-09-13

## 2017-08-14 NOTE — PROGRESS NOTES
[facility]was admitted for epigastric abd pain and chest pain. Has f/u appt with gastro in 2 weeks. Also needs refills for pain medicine for chronic pelvic pain due to endometriosis.        Chest Pain    This is a new proble Musculoskeletal: Positive for back pain. Negative for myalgias. Skin: Negative. Neurological: Negative for dizziness, weakness and headaches.         08/14/17  0822   BP: 118/77   Pulse: 88   Temp: 98.9 °F (37.2 °C)   TempSrc: Oral   Weight: 175 lb ( daily. Disp: 30 tablet Rfl: 3   VENTOLIN  (90 Base) MCG/ACT Inhalation Aero Soln Inhale 2 puffs into the lungs every 6 (six) hours as needed for Wheezing.  Disp: 1 Inhaler Rfl: 3       Allergies:    Cyclobenzaprine             Comment:rash  Singulair call with questions or concerns. Orders Placed This Encounter      Pantoprazole Sodium 40 MG Oral Tab EC      sucralfate (CARAFATE) 1 g Oral Tab          Sig: Take 1 tablet (1 g total) by mouth 4 (four) times daily before meals and nightly.           Dis

## 2017-08-14 NOTE — PATIENT INSTRUCTIONS
GASTROESOPHAGEAL REFLUX DISEASE    -avoid caffeinated or carbonated beverages, fried foods, spicy foods or highly acidic foods (citrus, onions, tomatoes, etc)  -don’t lay down 20-30 minutes after eating or drinking  -use wedge pillow under mattress or use

## 2017-08-28 ENCOUNTER — OFFICE VISIT (OUTPATIENT)
Dept: FAMILY MEDICINE CLINIC | Facility: CLINIC | Age: 32
End: 2017-08-28

## 2017-08-28 VITALS — DIASTOLIC BLOOD PRESSURE: 74 MMHG | HEIGHT: 66 IN | HEART RATE: 118 BPM | SYSTOLIC BLOOD PRESSURE: 118 MMHG

## 2017-08-28 DIAGNOSIS — M79.645 BILATERAL THUMB PAIN: ICD-10-CM

## 2017-08-28 DIAGNOSIS — M79.644 BILATERAL THUMB PAIN: ICD-10-CM

## 2017-08-28 DIAGNOSIS — M54.50 CHRONIC BILATERAL LOW BACK PAIN WITHOUT SCIATICA: Primary | ICD-10-CM

## 2017-08-28 DIAGNOSIS — G89.29 CHRONIC BILATERAL LOW BACK PAIN WITHOUT SCIATICA: Primary | ICD-10-CM

## 2017-08-28 DIAGNOSIS — N80.9 ENDOMETRIOSIS: ICD-10-CM

## 2017-08-28 PROCEDURE — 99214 OFFICE O/P EST MOD 30 MIN: CPT | Performed by: NURSE PRACTITIONER

## 2017-08-28 RX ORDER — CARISOPRODOL 350 MG/1
350 TABLET ORAL 4 TIMES DAILY PRN
Qty: 50 TABLET | Refills: 2 | Status: SHIPPED | OUTPATIENT
Start: 2017-08-28 | End: 2017-09-13

## 2017-08-28 RX ORDER — GABAPENTIN 300 MG/1
CAPSULE ORAL
Qty: 90 CAPSULE | Refills: 3 | Status: SHIPPED | OUTPATIENT
Start: 2017-08-28 | End: 2018-03-06

## 2017-08-28 NOTE — PROGRESS NOTES
HPI  Pt needs to have note for work-was suspended due to absences related to multiple health problems. Is still having bilat hand/wrist pain radiating to elbows. Is still having a lot of pain related to low back pain and internal pelvic pain.     Review o 1 cap bid for 3 days then increase to 1 cap tid. Disp: 90 capsule Rfl: 3   Carisoprodol (SOMA) 350 MG Oral Tab Take 1 tablet (350 mg total) by mouth 4 (four) times daily as needed for Muscle spasms.  Disp: 50 tablet Rfl: 2   Pantoprazole Sodium 40 MG Oral T edema.   Lymphadenopathy:     She has no cervical adenopathy. Neurological: She is oriented to person, place, and time. Skin: Skin is warm. Psychiatric: She has a normal mood and affect.  Her behavior is normal. Judgment and thought content normal.

## 2017-09-07 ENCOUNTER — TELEPHONE (OUTPATIENT)
Dept: OTHER | Age: 32
End: 2017-09-07

## 2017-09-07 NOTE — TELEPHONE ENCOUNTER
Patient states she needs a PA for hydrocodone-acetaminophen; she has been paying OOP for the medication. - contacted [facility] pharmacist and patient also needs PA on Soma 350 mg tab.

## 2017-09-07 NOTE — TELEPHONE ENCOUNTER
PA for Hydrocodone-Acetaminophen  mg tab and Carisoprodol 350 mg tab completed with EPS via CMM response time 24-72 hours NELSON [id number], [id number].

## 2017-09-08 NOTE — TELEPHONE ENCOUNTER
Hydrocodone-Acetaminophen  mg tab approved for 90 days #120/30 days effective 9/7/2017. [address] notified. Carisoprodol 350 mg tab has been denied.  Plan states please use preferred drug list medications; pararfon forte, flexeril, baclofen

## 2017-09-13 ENCOUNTER — TELEPHONE (OUTPATIENT)
Dept: OTHER | Age: 32
End: 2017-09-13

## 2017-09-13 ENCOUNTER — OFFICE VISIT (OUTPATIENT)
Dept: FAMILY MEDICINE CLINIC | Facility: CLINIC | Age: 32
End: 2017-09-13

## 2017-09-13 VITALS
WEIGHT: 175 LBS | SYSTOLIC BLOOD PRESSURE: 113 MMHG | DIASTOLIC BLOOD PRESSURE: 70 MMHG | HEIGHT: 66 IN | HEART RATE: 81 BPM | BODY MASS INDEX: 28.13 KG/M2

## 2017-09-13 DIAGNOSIS — J45.20 MILD INTERMITTENT ASTHMA WITHOUT COMPLICATION: Primary | ICD-10-CM

## 2017-09-13 DIAGNOSIS — N80.9 ENDOMETRIOSIS: ICD-10-CM

## 2017-09-13 DIAGNOSIS — J30.9 CHRONIC ALLERGIC RHINITIS, UNSPECIFIED SEASONALITY, UNSPECIFIED TRIGGER: ICD-10-CM

## 2017-09-13 DIAGNOSIS — G89.29 CHRONIC BILATERAL LOW BACK PAIN WITHOUT SCIATICA: ICD-10-CM

## 2017-09-13 DIAGNOSIS — M54.50 CHRONIC BILATERAL LOW BACK PAIN WITHOUT SCIATICA: ICD-10-CM

## 2017-09-13 DIAGNOSIS — K21.9 GASTROESOPHAGEAL REFLUX DISEASE WITHOUT ESOPHAGITIS: ICD-10-CM

## 2017-09-13 DIAGNOSIS — R10.2 PELVIC PAIN: ICD-10-CM

## 2017-09-13 PROCEDURE — 99214 OFFICE O/P EST MOD 30 MIN: CPT | Performed by: NURSE PRACTITIONER

## 2017-09-13 RX ORDER — PANTOPRAZOLE SODIUM 40 MG/1
40 TABLET, DELAYED RELEASE ORAL
Qty: 30 TABLET | Refills: 3 | Status: SHIPPED | OUTPATIENT
Start: 2017-09-13 | End: 2018-03-06 | Stop reason: ALTCHOICE

## 2017-09-13 RX ORDER — CARISOPRODOL 350 MG/1
350 TABLET ORAL 4 TIMES DAILY PRN
Qty: 50 TABLET | Refills: 2 | Status: SHIPPED | OUTPATIENT
Start: 2017-09-13 | End: 2018-03-06

## 2017-09-13 RX ORDER — SUCRALFATE 1 G/1
1 TABLET ORAL
Qty: 360 TABLET | Refills: 3 | Status: SHIPPED | OUTPATIENT
Start: 2017-09-13 | End: 2018-03-06 | Stop reason: ALTCHOICE

## 2017-09-13 RX ORDER — NITROFURANTOIN 25; 75 MG/1; MG/1
CAPSULE ORAL
Qty: 20 CAPSULE | Refills: 1 | Status: SHIPPED | OUTPATIENT
Start: 2017-09-13 | End: 2018-03-06 | Stop reason: ALTCHOICE

## 2017-09-13 RX ORDER — TRAMADOL HYDROCHLORIDE 50 MG/1
TABLET ORAL
COMMUNITY
Start: 2017-09-11 | End: 2017-09-13 | Stop reason: ALTCHOICE

## 2017-09-13 RX ORDER — HYDROCODONE BITARTRATE AND ACETAMINOPHEN 10; 325 MG/1; MG/1
1 TABLET ORAL EVERY 6 HOURS PRN
Qty: 60 TABLET | Refills: 0 | Status: SHIPPED | OUTPATIENT
Start: 2017-09-13 | End: 2018-03-06

## 2017-09-13 NOTE — TELEPHONE ENCOUNTER
Tell [facility] that this problem was fixed 2 months ago and to go to [address] [place] [address] website to verify so they can update their system   Dr Luz Pak [id number]

## 2017-09-13 NOTE — TELEPHONE ENCOUNTER
ANGELA--RUDOLPHI    Spoke with [facility] pharmacy--stating Raina Roper  ANNELISE number is not going through for [facility]. Pharmacy requesting Dr. Hubert Carter # to fill prescription--given.

## 2017-09-13 NOTE — PROGRESS NOTES
HPI    Pt here for refills-has ill[employer] and will not be able to change until Oct 1. Review of Systems   Genitourinary: Positive for pelvic pain. Negative for dyspareunia, dysuria, urgency, vaginal bleeding, vaginal discharge and vaginal pain.    Musculo nightly. Disp: 360 tablet Rfl: 3   VENTOLIN  (90 Base) MCG/ACT Inhalation Aero Soln Inhale 2 puffs into the lungs every 6 (six) hours as needed for Wheezing.  Disp: 1 Inhaler Rfl: 3   Nitrofurantoin Monohyd Macro 100 MG Oral Cap I tablet po with sexu Endometriosis    6. Gastroesophageal reflux disease without esophagitis        Plan:   1. Refill albuterol hfa  2. Refill norco 10/325 mg I po q 6-8 hrs prn          The quantity dispensed should last 30 days.  Patient is responsible for safekeeping of all

## 2017-09-14 NOTE — TELEPHONE ENCOUNTER
Spoke with Yolis at [facility] and she will send a message about this to the [employer] help desk to look up and fix this issue as it is an issue with the whole [facility] system then.

## 2017-10-16 RX ORDER — CARISOPRODOL 350 MG/1
TABLET ORAL
Qty: 50 TABLET | Refills: 2 | OUTPATIENT
Start: 2017-10-16

## 2018-03-06 ENCOUNTER — OFFICE VISIT (OUTPATIENT)
Dept: FAMILY MEDICINE CLINIC | Facility: CLINIC | Age: 33
End: 2018-03-06

## 2018-03-06 VITALS
HEIGHT: 66 IN | BODY MASS INDEX: 31.34 KG/M2 | DIASTOLIC BLOOD PRESSURE: 78 MMHG | WEIGHT: 195 LBS | SYSTOLIC BLOOD PRESSURE: 119 MMHG | HEART RATE: 97 BPM

## 2018-03-06 DIAGNOSIS — G89.29 CHRONIC BILATERAL LOW BACK PAIN WITHOUT SCIATICA: ICD-10-CM

## 2018-03-06 DIAGNOSIS — K21.9 GASTROESOPHAGEAL REFLUX DISEASE WITHOUT ESOPHAGITIS: ICD-10-CM

## 2018-03-06 DIAGNOSIS — N76.0 ACUTE VAGINITIS: Primary | ICD-10-CM

## 2018-03-06 DIAGNOSIS — N80.9 ENDOMETRIOSIS: ICD-10-CM

## 2018-03-06 DIAGNOSIS — M54.50 CHRONIC BILATERAL LOW BACK PAIN WITHOUT SCIATICA: ICD-10-CM

## 2018-03-06 PROCEDURE — 99214 OFFICE O/P EST MOD 30 MIN: CPT | Performed by: NURSE PRACTITIONER

## 2018-03-06 RX ORDER — CARISOPRODOL 350 MG/1
350 TABLET ORAL 3 TIMES DAILY PRN
Qty: 50 TABLET | Refills: 0 | Status: SHIPPED | OUTPATIENT
Start: 2018-03-06 | End: 2018-04-04

## 2018-03-06 RX ORDER — HYDROCODONE BITARTRATE AND ACETAMINOPHEN 10; 325 MG/1; MG/1
1 TABLET ORAL EVERY 6 HOURS PRN
Qty: 60 TABLET | Refills: 0 | Status: SHIPPED | OUTPATIENT
Start: 2018-03-06 | End: 2018-04-04

## 2018-03-06 NOTE — PROGRESS NOTES
HPI  Pt here for vaginal discharge for past 3 days. Notices odor imelda after intercourse. Denies rash or itching to perineum. Would like to discuss pain management.   Moved to [place] for a while and saw doctor out there-placed on oxycontin and pt states into the lungs every 6 (six) hours as needed for Wheezing.  Disp: 1 Inhaler Rfl: 3       Allergies:    Cyclobenzaprine             Comment:rash             rash  Singulair [[place]*    Other (See Comments)    Comment:Mood changes    Physical Exam   Constit mouth 3 (three) times daily as needed for Muscle spasms. Dispense:  50 tablet          Refill:  0  None  Encouraged to sign up for My Chart if not already registered.

## 2018-03-08 LAB
GENITAL VAGINOSIS SCREEN: NEGATIVE
TRICHOMONAS SCREEN: NEGATIVE

## 2018-03-09 ENCOUNTER — TELEPHONE (OUTPATIENT)
Dept: FAMILY MEDICINE CLINIC | Facility: CLINIC | Age: 33
End: 2018-03-09

## 2018-03-09 NOTE — TELEPHONE ENCOUNTER
----- Message from VANNESSA Ferrell FNP-SAVANNAH sent at 3/8/2018 11:52 AM CST -----  Vaginal culture negative for yeast, bacterial for trichomonal infection.

## 2018-04-04 ENCOUNTER — OFFICE VISIT (OUTPATIENT)
Dept: FAMILY MEDICINE CLINIC | Facility: CLINIC | Age: 33
End: 2018-04-04

## 2018-04-04 VITALS
HEART RATE: 101 BPM | BODY MASS INDEX: 31.66 KG/M2 | WEIGHT: 197 LBS | HEIGHT: 66 IN | SYSTOLIC BLOOD PRESSURE: 121 MMHG | DIASTOLIC BLOOD PRESSURE: 76 MMHG

## 2018-04-04 DIAGNOSIS — M54.50 CHRONIC BILATERAL LOW BACK PAIN WITHOUT SCIATICA: ICD-10-CM

## 2018-04-04 DIAGNOSIS — B37.3 VAGINAL CANDIDIASIS: ICD-10-CM

## 2018-04-04 DIAGNOSIS — G89.29 CHRONIC BILATERAL LOW BACK PAIN WITHOUT SCIATICA: ICD-10-CM

## 2018-04-04 DIAGNOSIS — R30.0 DYSURIA: Primary | ICD-10-CM

## 2018-04-04 PROCEDURE — 99214 OFFICE O/P EST MOD 30 MIN: CPT | Performed by: NURSE PRACTITIONER

## 2018-04-04 PROCEDURE — 81003 URINALYSIS AUTO W/O SCOPE: CPT | Performed by: NURSE PRACTITIONER

## 2018-04-04 RX ORDER — CARISOPRODOL 350 MG/1
350 TABLET ORAL 3 TIMES DAILY PRN
Qty: 50 TABLET | Refills: 0 | Status: SHIPPED | OUTPATIENT
Start: 2018-04-04 | End: 2018-05-11

## 2018-04-04 RX ORDER — HYDROCODONE BITARTRATE AND ACETAMINOPHEN 10; 325 MG/1; MG/1
1 TABLET ORAL EVERY 6 HOURS PRN
Qty: 60 TABLET | Refills: 0 | Status: SHIPPED | OUTPATIENT
Start: 2018-04-04 | End: 2018-05-11

## 2018-04-04 RX ORDER — FLUCONAZOLE 200 MG/1
TABLET ORAL
Qty: 2 TABLET | Refills: 0 | Status: SHIPPED | OUTPATIENT
Start: 2018-04-04 | End: 2018-05-11 | Stop reason: ALTCHOICE

## 2018-04-04 NOTE — PATIENT INSTRUCTIONS
Urinary Tract Infections in Women    Urinary tract infections (UTIs) are most often caused by bacteria. These bacteria enter the urinary tract. The bacteria may come from outside the body.  Or they may travel from the skin outside the rectum or vagina int The lifestyle changes below will help get rid of your UTI. They may also help prevent future UTIs. · Drink plenty of fluids. This includes water, juice, or other caffeine-free drinks. Fluids help flush bacteria out of your body. · Empty your bladder.  Alw · Clumpy or thin, white discharge, which may look like cottage cheese  · No odor or minimal odor  · Severe vaginal itching or burning  · Burning with urination  · Swelling, redness of vulva  · Pain during sex  Treating yeast infection  Yeast infection is t

## 2018-04-04 NOTE — PROGRESS NOTES
HPI  Pt presents with pain and burning with urination for past couple of days. Has been taking macrobid once a day(given to be take after sexual intercourse). Has vaginal itching.     Needs refill on pain meds for chronic low back and pelvic pain-will be g hours as needed for Pain. Disp: 60 tablet Rfl: 0   Carisoprodol (SOMA) 350 MG Oral Tab Take 1 tablet (350 mg total) by mouth 3 (three) times daily as needed for Muscle spasms.  Disp: 50 tablet Rfl: 0   VENTOLIN  (90 Base) MCG/ACT Inhalation Aero Soln pt is in agreement. All questions answered. Pt to call with questions or concerns.     Orders Placed This Encounter      POC Urinalysis, Automated Dip without microscopy (PCA and EMMG ONLY) [[id number]]      fluconazole 200 MG Oral Tab          Sig: Take one tabl

## 2018-04-20 ENCOUNTER — TELEPHONE (OUTPATIENT)
Dept: FAMILY MEDICINE CLINIC | Facility: CLINIC | Age: 33
End: 2018-04-20

## 2018-04-20 NOTE — TELEPHONE ENCOUNTER
Pt has lost script in past-was told that our office's policy is that pt is responsible for keeping of narcotics. Pharmacy states script was filled [place]4/18[place] Pt was advised last time she lost script that it would not be refill.   Pt will n
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
## 2018-05-07 NOTE — TELEPHONE ENCOUNTER
Last Rx: hydrocodone 4/4/18 #60; Soma= 4/4/18 #50    Medications pending for review.     Please respond to pool: EM FM [facility] LPN/CMA    Recent Outpatient Visits            1 month ago [facility], [place] VANNESSA Martinez, F

## 2018-05-08 RX ORDER — CARISOPRODOL 350 MG/1
350 TABLET ORAL 3 TIMES DAILY PRN
Qty: 50 TABLET | Refills: 0 | OUTPATIENT
Start: 2018-05-08

## 2018-05-08 RX ORDER — HYDROCODONE BITARTRATE AND ACETAMINOPHEN 10; 325 MG/1; MG/1
1 TABLET ORAL EVERY 6 HOURS PRN
Qty: 60 TABLET | Refills: 0 | OUTPATIENT
Start: 2018-05-08

## 2018-05-09 NOTE — TELEPHONE ENCOUNTER
Pt states she missed a call from this office.   Informed pt needs OV for refill and pt states she spoke with someone yesterday in regards to this and she has already scheduled OV with ANGELA NP 5/11/18

## 2018-05-11 ENCOUNTER — OFFICE VISIT (OUTPATIENT)
Dept: FAMILY MEDICINE CLINIC | Facility: CLINIC | Age: 33
End: 2018-05-11

## 2018-05-11 VITALS
WEIGHT: 192 LBS | BODY MASS INDEX: 31 KG/M2 | DIASTOLIC BLOOD PRESSURE: 73 MMHG | SYSTOLIC BLOOD PRESSURE: 112 MMHG | TEMPERATURE: 98 F | HEART RATE: 102 BPM

## 2018-05-11 DIAGNOSIS — R10.2 PELVIC PAIN: ICD-10-CM

## 2018-05-11 DIAGNOSIS — M54.50 CHRONIC BILATERAL LOW BACK PAIN WITHOUT SCIATICA: Primary | ICD-10-CM

## 2018-05-11 DIAGNOSIS — J45.20 MILD INTERMITTENT ASTHMA WITHOUT COMPLICATION: ICD-10-CM

## 2018-05-11 DIAGNOSIS — J30.9 ALLERGIC RHINITIS, UNSPECIFIED SEASONALITY, UNSPECIFIED TRIGGER: ICD-10-CM

## 2018-05-11 DIAGNOSIS — G89.29 CHRONIC BILATERAL LOW BACK PAIN WITHOUT SCIATICA: Primary | ICD-10-CM

## 2018-05-11 PROCEDURE — 99214 OFFICE O/P EST MOD 30 MIN: CPT | Performed by: NURSE PRACTITIONER

## 2018-05-11 RX ORDER — CARISOPRODOL 350 MG/1
350 TABLET ORAL 3 TIMES DAILY PRN
Qty: 50 TABLET | Refills: 0 | Status: SHIPPED | OUTPATIENT
Start: 2018-05-11 | End: 2018-07-19

## 2018-05-11 RX ORDER — HYDROCODONE BITARTRATE AND ACETAMINOPHEN 10; 325 MG/1; MG/1
1 TABLET ORAL EVERY 6 HOURS PRN
Qty: 60 TABLET | Refills: 0 | Status: SHIPPED | OUTPATIENT
Start: 2018-05-11 | End: 2018-06-23 | Stop reason: ALTCHOICE

## 2018-05-11 NOTE — PROGRESS NOTES
HPI  Pt here for refill son pain medications for back pain. Was seen in ER 1 week ago for I and D abscess thigh-has resolved. Having problems with insurance so has not been able to be seen by pain management or gynecology.     Allergies and asthma have Prescriptions:  HYDROcodone-acetaminophen  MG Oral Tab Take 1 tablet by mouth every 6 (six) hours as needed for Pain.  Disp: 60 tablet Rfl: 0   VENTOLIN  (90 Base) MCG/ACT Inhalation Aero Soln Inhale 2 puffs into the lungs every 6 (six) hours a Psychiatric: She has a normal mood and affect. Her behavior is normal. Judgment and thought content normal.   Nursing note and vitals reviewed. Assessment:     1. Chronic bilateral low back pain without sciatica    2. Pelvic pain    3.  Mild intermit

## 2018-06-18 ENCOUNTER — APPOINTMENT (OUTPATIENT)
Dept: CT IMAGING | Facility: HOSPITAL | Age: 33
End: 2018-06-18
Attending: EMERGENCY MEDICINE
Payer: COMMERCIAL

## 2018-06-18 ENCOUNTER — HOSPITAL ENCOUNTER (EMERGENCY)
Facility: HOSPITAL | Age: 33
Discharge: HOME | End: 2018-06-18
Attending: EMERGENCY MEDICINE
Payer: COMMERCIAL

## 2018-06-18 VITALS
DIASTOLIC BLOOD PRESSURE: 55 MMHG | SYSTOLIC BLOOD PRESSURE: 95 MMHG | OXYGEN SATURATION: 99 % | RESPIRATION RATE: 16 BRPM | TEMPERATURE: 98 F | WEIGHT: 192 LBS | HEART RATE: 79 BPM | BODY MASS INDEX: 31 KG/M2

## 2018-06-18 DIAGNOSIS — S39.012A LUMBAR STRAIN, INITIAL ENCOUNTER: ICD-10-CM

## 2018-06-18 DIAGNOSIS — R51.9 ACUTE NONINTRACTABLE HEADACHE, UNSPECIFIED HEADACHE TYPE: Primary | ICD-10-CM

## 2018-06-18 PROCEDURE — 81001 URINALYSIS AUTO W/SCOPE: CPT | Performed by: EMERGENCY MEDICINE

## 2018-06-18 PROCEDURE — 99285 EMERGENCY DEPT VISIT HI MDM: CPT

## 2018-06-18 PROCEDURE — 70450 CT HEAD/BRAIN W/O DYE: CPT | Performed by: EMERGENCY MEDICINE

## 2018-06-18 PROCEDURE — 85025 COMPLETE CBC W/AUTO DIFF WBC: CPT | Performed by: EMERGENCY MEDICINE

## 2018-06-18 PROCEDURE — 96375 TX/PRO/DX INJ NEW DRUG ADDON: CPT

## 2018-06-18 PROCEDURE — 74176 CT ABD & PELVIS W/O CONTRAST: CPT | Performed by: EMERGENCY MEDICINE

## 2018-06-18 PROCEDURE — 80048 BASIC METABOLIC PNL TOTAL CA: CPT | Performed by: EMERGENCY MEDICINE

## 2018-06-18 PROCEDURE — 96374 THER/PROPH/DIAG INJ IV PUSH: CPT

## 2018-06-18 PROCEDURE — 96361 HYDRATE IV INFUSION ADD-ON: CPT

## 2018-06-18 PROCEDURE — 81025 URINE PREGNANCY TEST: CPT

## 2018-06-18 RX ORDER — PANTOPRAZOLE SODIUM 40 MG/1
40 TABLET, DELAYED RELEASE ORAL
COMMUNITY
End: 2018-08-28

## 2018-06-18 RX ORDER — CARISOPRODOL 350 MG/1
TABLET ORAL
COMMUNITY
End: 2018-07-19 | Stop reason: SDUPTHER

## 2018-06-18 RX ORDER — HYDROCODONE BITARTRATE AND ACETAMINOPHEN 5; 325 MG/1; MG/1
TABLET ORAL
COMMUNITY
End: 2018-06-23 | Stop reason: ALTCHOICE

## 2018-06-18 RX ORDER — METOCLOPRAMIDE HYDROCHLORIDE 5 MG/ML
5 INJECTION INTRAMUSCULAR; INTRAVENOUS ONCE
Status: COMPLETED | OUTPATIENT
Start: 2018-06-18 | End: 2018-06-18

## 2018-06-18 RX ORDER — SUCRALFATE 1 G/1
1 TABLET ORAL
COMMUNITY
End: 2018-08-28

## 2018-06-18 RX ORDER — DIAZEPAM 5 MG/1
5 TABLET ORAL 3 TIMES DAILY PRN
Qty: 20 TABLET | Refills: 0 | Status: SHIPPED | OUTPATIENT
Start: 2018-06-18 | End: 2018-06-25

## 2018-06-18 RX ORDER — MORPHINE SULFATE 4 MG/ML
2 INJECTION, SOLUTION INTRAMUSCULAR; INTRAVENOUS ONCE
Status: COMPLETED | OUTPATIENT
Start: 2018-06-18 | End: 2018-06-18

## 2018-06-18 RX ORDER — METHYLPREDNISOLONE SODIUM SUCCINATE 125 MG/2ML
125 INJECTION, POWDER, LYOPHILIZED, FOR SOLUTION INTRAMUSCULAR; INTRAVENOUS ONCE
Status: COMPLETED | OUTPATIENT
Start: 2018-06-18 | End: 2018-06-18

## 2018-06-18 RX ORDER — DIAZEPAM 5 MG/1
5 TABLET ORAL ONCE
Status: COMPLETED | OUTPATIENT
Start: 2018-06-18 | End: 2018-06-18

## 2018-06-18 RX ORDER — KETOROLAC TROMETHAMINE 30 MG/ML
30 INJECTION, SOLUTION INTRAMUSCULAR; INTRAVENOUS ONCE
Status: COMPLETED | OUTPATIENT
Start: 2018-06-18 | End: 2018-06-18

## 2018-06-18 NOTE — ED PROVIDER NOTES
Patient Seen in: [facility] AND [facility] Emergency Department    History   Patient presents with:  Headache (neurologic)    Stated Complaint: headache x 3 days with lower back pain, medication (tylenol, ibuprophen, aspiri*    HPI    The patient is a 33-year-ol Physical Exam   Constitutional: She is oriented to person, place, and time. She appears well-developed and well-nourished. HENT:   Head: Normocephalic and atraumatic.    Mouth/Throat: Oropharynx is clear and moist.   Eyes: Conjunctivae and EOM are normal. The following orders were created for panel order CBC WITH DIFFERENTIAL WITH PLATELET.   Procedure                               Abnormality         Status                     ---------                               -----------         ------ VANNESSA Rodriguez, FNP-C  [address] [place] [address]  [phone]    Schedule an appointment as soon as possible for a visit          Medications Prescribed:  Current Discharge Medication List    START taking these medications    keith

## 2018-06-18 NOTE — ED INITIAL ASSESSMENT (HPI)
Headache x3 days, denies nausea. States vision is blurry. Denies photosensitivity/ sensitivity to sound. Denies trauma. States she took otc meds, no improvement.  Also states she has low back pain, with \"3 knots down there\", denies numbness/tingling to

## 2018-06-23 ENCOUNTER — HOSPITAL ENCOUNTER (EMERGENCY)
Facility: HOSPITAL | Age: 33
Discharge: HOME | End: 2018-06-23
Attending: EMERGENCY MEDICINE
Payer: COMMERCIAL

## 2018-06-23 VITALS
WEIGHT: 192 LBS | SYSTOLIC BLOOD PRESSURE: 126 MMHG | RESPIRATION RATE: 18 BRPM | TEMPERATURE: 99 F | OXYGEN SATURATION: 99 % | BODY MASS INDEX: 30.86 KG/M2 | HEART RATE: 78 BPM | DIASTOLIC BLOOD PRESSURE: 66 MMHG | HEIGHT: 66 IN

## 2018-06-23 DIAGNOSIS — S39.012A BACK STRAIN, INITIAL ENCOUNTER: Primary | ICD-10-CM

## 2018-06-23 PROCEDURE — 81025 URINE PREGNANCY TEST: CPT

## 2018-06-23 PROCEDURE — 99283 EMERGENCY DEPT VISIT LOW MDM: CPT

## 2018-06-23 RX ORDER — ACETAMINOPHEN AND CODEINE PHOSPHATE 300; 30 MG/1; MG/1
1 TABLET ORAL EVERY 4 HOURS PRN
Qty: 10 TABLET | Refills: 0 | Status: SHIPPED | OUTPATIENT
Start: 2018-06-23 | End: 2018-06-30

## 2018-06-23 RX ORDER — CARISOPRODOL 350 MG/1
TABLET ORAL
Qty: 50 TABLET | Refills: 0
Start: 2018-06-23

## 2018-06-23 RX ORDER — DIAZEPAM 2 MG/1
2 TABLET ORAL 3 TIMES DAILY PRN
Qty: 20 TABLET | Refills: 0 | Status: SHIPPED | OUTPATIENT
Start: 2018-06-23 | End: 2018-06-30

## 2018-06-23 RX ORDER — MAGNESIUM HYDROXIDE/ALUMINUM HYDROXICE/SIMETHICONE 120; 1200; 1200 MG/30ML; MG/30ML; MG/30ML
30 SUSPENSION ORAL ONCE
Status: COMPLETED | OUTPATIENT
Start: 2018-06-23 | End: 2018-06-23

## 2018-06-23 RX ORDER — IBUPROFEN 600 MG/1
600 TABLET ORAL ONCE
Status: COMPLETED | OUTPATIENT
Start: 2018-06-23 | End: 2018-06-23

## 2018-06-23 RX ORDER — IBUPROFEN 400 MG/1
400 TABLET ORAL EVERY 8 HOURS PRN
Qty: 21 TABLET | Refills: 0 | Status: SHIPPED | OUTPATIENT
Start: 2018-06-23 | End: 2018-06-30

## 2018-06-23 NOTE — TELEPHONE ENCOUNTER
LOV: 5-11-18 Last Rx: 5-11-18     Please advise in regards to refill request. Thank You    Please respond to pool: ERON ENRIQUEZO LPN/BRYAN

## 2018-06-23 NOTE — ED PROVIDER NOTES
Patient Seen in: [facility] AND [facility] Emergency Department    History   Patient presents with:  Back Pain (musculoskeletal)    Stated Complaint: lower back pain \"caught myself falling yesterday\"     HPI    Patient presents emergency department complaining Normal rate and regular rhythm. No murmur heard. Pulmonary/Chest: Effort normal and breath sounds normal. No respiratory distress. Abdominal: Soft. She exhibits no distension. There is no tenderness.    Musculoskeletal:   Examination of the back shows

## 2018-07-08 ENCOUNTER — APPOINTMENT (OUTPATIENT)
Dept: GENERAL RADIOLOGY | Facility: HOSPITAL | Age: 33
End: 2018-07-08
Attending: NURSE PRACTITIONER

## 2018-07-08 ENCOUNTER — HOSPITAL ENCOUNTER (EMERGENCY)
Facility: HOSPITAL | Age: 33
Discharge: HOME | End: 2018-07-08

## 2018-07-08 VITALS
HEART RATE: 83 BPM | BODY MASS INDEX: 30.86 KG/M2 | SYSTOLIC BLOOD PRESSURE: 122 MMHG | OXYGEN SATURATION: 99 % | TEMPERATURE: 99 F | HEIGHT: 66 IN | WEIGHT: 192 LBS | RESPIRATION RATE: 18 BRPM | DIASTOLIC BLOOD PRESSURE: 72 MMHG

## 2018-07-08 DIAGNOSIS — M54.9 BACK PAIN WITHOUT RADIATION: Primary | ICD-10-CM

## 2018-07-08 LAB — B-HCG UR QL: NEGATIVE

## 2018-07-08 PROCEDURE — 81025 URINE PREGNANCY TEST: CPT

## 2018-07-08 PROCEDURE — 72220 X-RAY EXAM SACRUM TAILBONE: CPT | Performed by: NURSE PRACTITIONER

## 2018-07-08 PROCEDURE — 72100 X-RAY EXAM L-S SPINE 2/3 VWS: CPT | Performed by: NURSE PRACTITIONER

## 2018-07-08 PROCEDURE — 72072 X-RAY EXAM THORAC SPINE 3VWS: CPT | Performed by: NURSE PRACTITIONER

## 2018-07-08 PROCEDURE — 99284 EMERGENCY DEPT VISIT MOD MDM: CPT

## 2018-07-08 RX ORDER — HYDROCODONE BITARTRATE AND ACETAMINOPHEN 10; 325 MG/1; MG/1
1 TABLET ORAL EVERY 6 HOURS PRN
Qty: 8 TABLET | Refills: 0 | Status: SHIPPED | OUTPATIENT
Start: 2018-07-08 | End: 2018-07-11

## 2018-07-08 RX ORDER — CARISOPRODOL 350 MG/1
350 TABLET ORAL 3 TIMES DAILY PRN
Qty: 8 TABLET | Refills: 0 | Status: SHIPPED | OUTPATIENT
Start: 2018-07-08 | End: 2018-07-13

## 2018-07-08 RX ORDER — HYDROCODONE BITARTRATE AND ACETAMINOPHEN 5; 325 MG/1; MG/1
1 TABLET ORAL ONCE
Status: COMPLETED | OUTPATIENT
Start: 2018-07-08 | End: 2018-07-08

## 2018-07-08 RX ORDER — PREDNISONE 20 MG/1
40 TABLET ORAL DAILY
Qty: 8 TABLET | Refills: 0 | Status: SHIPPED | OUTPATIENT
Start: 2018-07-08 | End: 2018-07-12

## 2018-07-08 RX ORDER — IBUPROFEN 800 MG/1
800 TABLET ORAL ONCE
Status: COMPLETED | OUTPATIENT
Start: 2018-07-08 | End: 2018-07-08

## 2018-07-09 NOTE — CM/SW NOTE
This writer spoke w/Dima in registration to [employer] plan of patient, per system patient is coming up ineligible. Patient v/u and stated she will contact # of [facility] to check on her eligibility.   Meanwhile, patient was given resources for VN

## 2018-07-09 NOTE — ED NOTES
Discussed discharge and follow-up instructions with patient as well as importance of safety with medications and side effects.  Patient verbalized understanding, asked if there was Diane Barnhart number to call for someone to pay for my medications since this happened

## 2018-07-09 NOTE — ED NOTES
Patient asked RN \"what dose of [address] did you give me because it's not working. \" Offered patient an ice pack or warm pack and assistance with repositioning. Patient stated \"I'll try another pillow for my booty and an ice pack.  If I lay down I won't be ab

## 2018-07-09 NOTE — ED PROVIDER NOTES
Patient Seen in: [facility] AND [facility] Emergency Department    History   CC: back pain  HPI: Wilda Nieto 34year old female w/ hx back pain, asthma who presents to the ER c/o back pain that stands from her mid back down to her but status post fall while tu negative except as noted above. PSFH elements reviewed from today and agreed except as otherwise stated in HPI. Medications :   HYDROcodone-acetaminophen  MG Oral Tab,  Take 1 tablet by mouth every 6 (six) hours as needed for Pain.    Arielle Form <2seconds distal LE  Neuro - A&O x4, 2+ reflexes bilat.  patellar, & Achilles, sharp and dull sensation equal to both medial and lateral aspects of lower extremities, steady gait  MSK - makes purposeful movements of lower extremities with full ROM noted, fo MG Oral Tab  Take 1 tablet by mouth every 6 (six) hours as needed for Pain. Qty: 8 tablet Refills: 0    !! Carisoprodol 350 MG Oral Tab  Take 1 tablet (350 mg total) by mouth 3 (three) times daily as needed for Muscle spasms.   Qty: 8 tablet Refills: 0

## 2018-07-16 ENCOUNTER — APPOINTMENT (OUTPATIENT)
Dept: GENERAL RADIOLOGY | Facility: HOSPITAL | Age: 33
End: 2018-07-16
Attending: EMERGENCY MEDICINE

## 2018-07-16 ENCOUNTER — HOSPITAL ENCOUNTER (EMERGENCY)
Facility: HOSPITAL | Age: 33
Discharge: HOME | End: 2018-07-16
Attending: EMERGENCY MEDICINE

## 2018-07-16 VITALS
OXYGEN SATURATION: 98 % | SYSTOLIC BLOOD PRESSURE: 121 MMHG | RESPIRATION RATE: 16 BRPM | DIASTOLIC BLOOD PRESSURE: 70 MMHG | WEIGHT: 192 LBS | HEART RATE: 90 BPM | TEMPERATURE: 98 F | BODY MASS INDEX: 30.86 KG/M2 | HEIGHT: 66 IN

## 2018-07-16 DIAGNOSIS — M79.671 FOOT PAIN, RIGHT: Primary | ICD-10-CM

## 2018-07-16 PROCEDURE — 99283 EMERGENCY DEPT VISIT LOW MDM: CPT

## 2018-07-16 PROCEDURE — 73630 X-RAY EXAM OF FOOT: CPT | Performed by: EMERGENCY MEDICINE

## 2018-07-16 RX ORDER — TRAMADOL HYDROCHLORIDE 50 MG/1
50 TABLET ORAL EVERY 8 HOURS PRN
Qty: 10 TABLET | Refills: 0 | Status: SHIPPED | OUTPATIENT
Start: 2018-07-16 | End: 2020-05-22

## 2018-07-16 RX ORDER — METHYLPREDNISOLONE 4 MG/1
TABLET ORAL
Qty: 1 PACKAGE | Refills: 0 | Status: SHIPPED | OUTPATIENT
Start: 2018-07-16 | End: 2018-07-16

## 2018-07-16 RX ORDER — GABAPENTIN 100 MG/1
100 CAPSULE ORAL 3 TIMES DAILY
Qty: 30 CAPSULE | Refills: 0 | Status: SHIPPED | OUTPATIENT
Start: 2018-07-16 | End: 2018-07-26

## 2018-07-16 RX ORDER — IBUPROFEN 800 MG/1
800 TABLET ORAL ONCE
Status: COMPLETED | OUTPATIENT
Start: 2018-07-16 | End: 2018-07-16

## 2018-07-17 ENCOUNTER — NURSE TRIAGE (OUTPATIENT)
Dept: FAMILY MEDICINE CLINIC | Facility: CLINIC | Age: 33
End: 2018-07-17

## 2018-07-17 NOTE — TELEPHONE ENCOUNTER
Rosemary Cruz, please advise. Patient still waiting on appt for tomorrow Wed 7/18/18 in the afternoon. She needs to see her PCP tomorrow please.

## 2018-07-17 NOTE — TELEPHONE ENCOUNTER
Spoke with patient (identified name and ) ,advised [facility] note  and agrees with  Plan. OV made on 18 at 1:30 PM in [facility]. Note      We verified that she does not have insurance so she can be seen.  I don't have any openings tomorrow but sh

## 2018-07-17 NOTE — ED PROVIDER NOTES
Patient Seen in: [facility] AND [facility] Emergency Department    History   Patient presents with:  Back Pain (musculoskeletal)    Stated Complaint: Back, R leg pain    HPI    33 y/o female w/ chronic pain of the back who was recently seen after a fall and exac BMI 30.99 kg/m²         Physical Exam    Constitutional: Oriented to person, place, and time. Appears well-developed. No distress. Head: Normocephalic and atraumatic. Eyes: Conjunctivae are normal. Pupils are equal, round, and reactive to light.    Ne MD PRAVEEN on 7/16/2018 at 20:31                  MDM     I explained to the patient there is no indication for her to continue receiving Norco and Soma at this time.   I will give her a Medrol Dosepak and some Ultram.  I will give her podiatry and physiatry fol

## 2018-07-17 NOTE — TELEPHONE ENCOUNTER
If pt has not switched us as her primary care provider with her insurance, we can't see her-she needs to be seen by her primary.

## 2018-07-17 NOTE — TELEPHONE ENCOUNTER
We verified that she does not have insurance so she can be seen.  I don't have any openings tomorrow but she can make an appt for Thursday as I have openings

## 2018-07-17 NOTE — ED NOTES
Discharged home with plan to follow up with PCP/ortho as indicated. Alert and interactive. Hemodynamically stable. Agrees with pain management plan.  Ambulates to exit with steady gait

## 2018-07-17 NOTE — ED NOTES
Care assumed from triage Ambulates to room with steady gait Alert and interactive S/P ground level fall mechanical fall several weeks ago and presents with persistent lumbar back/ R foot/and bilateral shoulder pain Currently with walking boot to RLE + dist

## 2018-07-17 NOTE — TELEPHONE ENCOUNTER
Spoke with patient who is requesting to know the status of the previous message. Patient made aware we are still waiting on a response from the provider. Patient voiced understanding.

## 2018-07-17 NOTE — TELEPHONE ENCOUNTER
Patient contacted, she has no insurance, she is a self pay, can you see her Wednesday  I asked her why she didn't see Paulo, ER referred her to, she states her  will specify the specialists and to see her primary for short term care for the pain

## 2018-07-17 NOTE — TELEPHONE ENCOUNTER
Action Requested: Summary for Provider     []  Critical Lab, Recommendations Needed  [x] Need Additional Advice  []   FYI    []   Need Orders  [] Need Medications Sent to Pharmacy  []  Other     SUMMARY: Shanel Murphy, patient had a fall 7/11 went to ER for back a

## 2018-07-17 NOTE — ED INITIAL ASSESSMENT (HPI)
C/o back pain with R foot pain s/p fall one week, seen by a doctor, given medicine and walking boot, pain persists

## 2018-07-19 ENCOUNTER — OFFICE VISIT (OUTPATIENT)
Dept: FAMILY MEDICINE CLINIC | Facility: CLINIC | Age: 33
End: 2018-07-19
Payer: MEDICAID

## 2018-07-19 VITALS
HEIGHT: 66 IN | HEART RATE: 111 BPM | SYSTOLIC BLOOD PRESSURE: 132 MMHG | WEIGHT: 192 LBS | DIASTOLIC BLOOD PRESSURE: 80 MMHG | BODY MASS INDEX: 30.86 KG/M2

## 2018-07-19 DIAGNOSIS — M79.671 RIGHT FOOT PAIN: Primary | ICD-10-CM

## 2018-07-19 DIAGNOSIS — M54.50 CHRONIC BILATERAL LOW BACK PAIN WITHOUT SCIATICA: ICD-10-CM

## 2018-07-19 DIAGNOSIS — G89.29 CHRONIC BILATERAL LOW BACK PAIN WITHOUT SCIATICA: ICD-10-CM

## 2018-07-19 PROCEDURE — 99214 OFFICE O/P EST MOD 30 MIN: CPT | Performed by: NURSE PRACTITIONER

## 2018-07-19 RX ORDER — HYDROCODONE BITARTRATE AND ACETAMINOPHEN 10; 325 MG/1; MG/1
1 TABLET ORAL EVERY 6 HOURS PRN
Qty: 60 TABLET | Refills: 0 | Status: SHIPPED | OUTPATIENT
Start: 2018-07-19 | End: 2018-09-01 | Stop reason: RX

## 2018-07-19 RX ORDER — CARISOPRODOL 350 MG/1
350 TABLET ORAL 3 TIMES DAILY PRN
Qty: 40 TABLET | Refills: 0 | Status: SHIPPED | OUTPATIENT
Start: 2018-07-19 | End: 2020-07-14 | Stop reason: ALTCHOICE

## 2018-07-19 NOTE — ASSESSMENT & PLAN NOTE
Pt referred to pain management  #30 day supply of norco and soma given to pt and she was advised that any refills of pain medication must be through pain management-family medicine will not refill any additional pain meds of any sort after today.  Pt states

## 2018-07-19 NOTE — PROGRESS NOTES
HPI  Pt presents for f/u ER visits. Was getting on elevator and slipped on a puddle of water on 7/8/18. Jeny Arteagaix on back side and felt pain in right ankle-went to [place] ER.  F/u on 7/11 and 7/12 in [place] due to \"severe pain\" in right heel and back pa TraMADol HCl 50 MG Oral Tab Take 1 tablet (50 mg total) by mouth every 8 (eight) hours as needed for Pain. Disp: 10 tablet Rfl: 0   gabapentin 100 MG Oral Cap Take 1 capsule (100 mg total) by mouth 3 (three) times daily.  Disp: 30 capsule Rfl: 0   sucralfat 07/11/2018 Emergency Emergency[facility] [place] [id number] [place] [address]   [phone]   Huyen Mazariegos MD    [place] [address] [place] [address]   [phone] ([address] [facility], initial encounter (Pr · NSAIDs , such as ibuprofen, help decrease swelling, pain, and fever. This medicine is available with or without a doctor's order. NSAIDs can cause stomach bleeding or kidney problems in certain people.  If you take blood thinner medicine, always ask if NS Exercise your foot: You may be given exercises to improve your strength and to help decrease stiffness. The exercises and physical therapy can help restore strength and increase the range of motion in your foot.  Ask your healthcare provider when you can re · You have a fever, redness, swelling, or warmth around your injury. · You have questions or concerns about your condition or care. Medicines: You may need any of the following:  · Acetaminophen decreases pain and fever.  It is available without a docto · Elevate your injured leg by lying down and resting it on pillows that are higher than your heart. This should be done as often as you can for at least 2 days to reduce swelling. Activity: Ask your healthcare provider how much activity is right for you. [address] [place] [address], [facility]   [phone] (Fax)   Leg Pain   Social History  - as of this encounter  Tobacco Use Types Packs/Day Years Used Date   Former Smoker       Quit: 2014   Smokeless Tobacco: Never Used           Alcohol Indications:  Ankle pain Take 1 Tab by mouth every 6 hours as needed for up to 10 doses for pain 10 Tab   0 07/12/2018 07/12/2018   Discharge Disposition  - in this encounter  Disposition Code José Manuel 66 or Bryce 89      ED Physical Exam:   /67 (Patient Position: Sitting)  Pulse (!) 115  Temp 97.8 °F (36.6 °C) (Temporal)  Resp 18  Ht 5' 6\" (1.676 m)  Wt 192 lb (87.1 kg)  SpO2 99%  BMI 30.99 kg/m²   GENERAL: No acute distress, cooperative.   HEENT: Pupils normal, [place] and [place] -pt has been to ER/UC 11 times since December 2017.          Post- visit-pt walked out of office and then approached  about getting refills on pain medications-advised that this is the last month that she cesilia

## 2018-07-20 ENCOUNTER — TELEPHONE (OUTPATIENT)
Dept: OTHER | Age: 33
End: 2018-07-20

## 2018-07-20 NOTE — TELEPHONE ENCOUNTER
Shae calling and asking why Joaquin Thompson was given. Instructed for low back pain and the patient was referred to pain management with understanding.

## 2018-08-16 ENCOUNTER — OFFICE VISIT (OUTPATIENT)
Dept: FAMILY MEDICINE CLINIC | Facility: CLINIC | Age: 33
End: 2018-08-16

## 2018-08-16 VITALS
SYSTOLIC BLOOD PRESSURE: 128 MMHG | HEIGHT: 66 IN | BODY MASS INDEX: 31.18 KG/M2 | HEART RATE: 134 BPM | DIASTOLIC BLOOD PRESSURE: 66 MMHG | WEIGHT: 194 LBS

## 2018-08-16 DIAGNOSIS — G89.29 CHRONIC BILATERAL LOW BACK PAIN WITHOUT SCIATICA: Primary | ICD-10-CM

## 2018-08-16 DIAGNOSIS — M54.50 CHRONIC BILATERAL LOW BACK PAIN WITHOUT SCIATICA: Primary | ICD-10-CM

## 2018-08-16 DIAGNOSIS — M79.671 RIGHT FOOT PAIN: ICD-10-CM

## 2018-08-16 PROCEDURE — 99213 OFFICE O/P EST LOW 20 MIN: CPT | Performed by: NURSE PRACTITIONER

## 2018-08-16 NOTE — PATIENT INSTRUCTIONS
Madhavi Talbot M.D.     Address: [address] [place] [address] [address], [place], [address] [place] [address]    Phone: [phone]

## 2018-08-16 NOTE — PROGRESS NOTES
HPI    Pt here for refill on pain medications.  Pt states she tried to make appt with pain management at [facility] told they do not take self pay pts  seening  outside podiatrist for foot injury that occurred at [facility] done on foot ea (50 mg total) by mouth every 8 (eight) hours as needed for Pain. Disp: 10 tablet Rfl: 0   sucralfate 1 g Oral Tab Take 1 g by mouth. Disp:  Rfl:    Pantoprazole Sodium 40 MG Oral Tab EC Take 40 mg by mouth.  Disp:  Rfl:    VENTOLIN  (90 Base) MCG/ACT

## 2018-08-28 ENCOUNTER — TELEPHONE (OUTPATIENT)
Dept: OTHER | Age: 33
End: 2018-08-28

## 2018-08-28 ENCOUNTER — OFFICE VISIT (OUTPATIENT)
Dept: FAMILY MEDICINE CLINIC | Facility: CLINIC | Age: 33
End: 2018-08-28

## 2018-08-28 ENCOUNTER — TELEPHONE (OUTPATIENT)
Dept: FAMILY MEDICINE CLINIC | Facility: CLINIC | Age: 33
End: 2018-08-28

## 2018-08-28 VITALS
DIASTOLIC BLOOD PRESSURE: 86 MMHG | HEART RATE: 111 BPM | WEIGHT: 194 LBS | BODY MASS INDEX: 31 KG/M2 | SYSTOLIC BLOOD PRESSURE: 119 MMHG

## 2018-08-28 DIAGNOSIS — M53.3 SACRAL PAIN: Primary | ICD-10-CM

## 2018-08-28 DIAGNOSIS — M25.512 ACUTE PAIN OF LEFT SHOULDER: ICD-10-CM

## 2018-08-28 DIAGNOSIS — K21.9 GASTROESOPHAGEAL REFLUX DISEASE WITHOUT ESOPHAGITIS: ICD-10-CM

## 2018-08-28 DIAGNOSIS — M79.671 RIGHT FOOT PAIN: ICD-10-CM

## 2018-08-28 PROCEDURE — 99214 OFFICE O/P EST MOD 30 MIN: CPT | Performed by: FAMILY MEDICINE

## 2018-08-28 PROCEDURE — 99212 OFFICE O/P EST SF 10 MIN: CPT | Performed by: FAMILY MEDICINE

## 2018-08-28 RX ORDER — SUCRALFATE 1 G/1
1 TABLET ORAL
Qty: 90 TABLET | Refills: 0 | Status: SHIPPED | OUTPATIENT
Start: 2018-08-28 | End: 2020-07-14 | Stop reason: ALTCHOICE

## 2018-08-28 RX ORDER — PANTOPRAZOLE SODIUM 40 MG/1
40 TABLET, DELAYED RELEASE ORAL
Qty: 30 TABLET | Refills: 0 | Status: SHIPPED | OUTPATIENT
Start: 2018-08-28 | End: 2020-07-14 | Stop reason: ALTCHOICE

## 2018-08-28 NOTE — TELEPHONE ENCOUNTER
Patient called stating she has been at pharmacy for one hour waiting for Dr. Balta Bardales to change script for tramadol-acetaminophen 37.5-325 MG Oral Tab. Per patient, pharmacy does not have tramadol-acetaminophen 37.5-325 MG Oral Tab available.    Spoke to

## 2018-08-28 NOTE — PROGRESS NOTES
8/28/2018  1:34 PM    Marcelino Bartlett is a 34year old female. Chief complaint(s): Patient presents with: Foot Pain: right foot pain   Shoulder Pain  Pain: tailbone pain     HPI:     Marcelino Tri primary complaint is regarding multiple complaints.      P Mother    • Migraines Mother       Social History: Smoking status: Former Smoker                                                              Packs/day: 0.00      Years: 0.00      Smokeless tobacco: Former User                     Alcohol use:  No Gastrointestinal: Positive for heartburn. Negative for nausea, vomiting, abdominal pain, diarrhea and constipation. Musculoskeletal: Positive for myalgias and back pain.         Right foot pain  Left shoulder pain   Neurological: Negative for seizures a morning before breakfast.      tramadol-acetaminophen 37.5-325 MG Oral Tab 60 tablet 0      Sig: Take 1 tablet by mouth every 8 (eight) hours as needed for Pain. Take it with food.        REFERRALS: 1OP REFERRAL PAIN MANGEMENT  ORTHOPEDIC - INTERNAL  XR SAC or worsening symptoms develop. Orders This Visit:  No orders of the defined types were placed in this encounter.       Meds This Visit:    Signed Prescriptions Disp Refills    sucralfate 1 g Oral Tab 90 tablet 0      Sig: Take 1 tablet (1 g total) by mo

## 2018-09-01 NOTE — TELEPHONE ENCOUNTER
Dr. Kenisha Salas, if script was called in on 8/28/18, we just need record that it was signed by you. Please sign script pending. I discontinued order for the tramadol-acetaminophen 37.5-325 MG Oral Tab so MAR is accurate.

## 2018-09-04 RX ORDER — TRAMADOL HYDROCHLORIDE 50 MG/1
50 TABLET ORAL EVERY 8 HOURS PRN
Qty: 60 TABLET | Refills: 0 | Status: SHIPPED | OUTPATIENT
Start: 2018-09-04 | End: 2020-05-22

## 2018-09-04 NOTE — TELEPHONE ENCOUNTER
I called [address] and Spoke with Shanti Pinedo who stts pt did  Tramadol rx on 8.28. No further action needed.

## 2018-09-06 ENCOUNTER — CHARTING TRANS (OUTPATIENT)
Dept: OTHER | Age: 33
End: 2018-09-06

## 2018-09-06 ASSESSMENT — PAIN SCALES - GENERAL: PAINLEVEL_OUTOF10: 0

## 2018-12-08 VITALS
RESPIRATION RATE: 17 BRPM | TEMPERATURE: 98.1 F | HEIGHT: 66 IN | WEIGHT: 189.6 LBS | OXYGEN SATURATION: 97 % | HEART RATE: 109 BPM | BODY MASS INDEX: 30.47 KG/M2

## 2018-12-20 RX ORDER — ALBUTEROL SULFATE 90 UG/1
AEROSOL, METERED RESPIRATORY (INHALATION)
COMMUNITY

## 2018-12-20 RX ORDER — DICLOFENAC SODIUM 75 MG/1
TABLET, DELAYED RELEASE ORAL
COMMUNITY

## 2018-12-20 RX ORDER — PANTOPRAZOLE SODIUM 40 MG/1
TABLET, DELAYED RELEASE ORAL
COMMUNITY

## 2020-01-14 ENCOUNTER — NURSE TRIAGE (OUTPATIENT)
Dept: FAMILY MEDICINE CLINIC | Facility: CLINIC | Age: 35
End: 2020-01-14

## 2020-01-14 NOTE — TELEPHONE ENCOUNTER
Call Details: Patient stated she had a work injury on 1/8/2020. Her left ankle is swollen and it hurts so bad. Also, she stated she's dizzy and gets headaches. Call transferred to RN Triage ([id number]).

## 2020-01-14 NOTE — TELEPHONE ENCOUNTER
Action Requested: Summary for Provider     []  Critical Lab, Recommendations Needed  [] Need Additional Advice  []   FYI    []   Need Orders  [] Need Medications Sent to Pharmacy  []  Other     SUMMARY: Patient states she fell at work and injured her ankle

## 2020-01-15 ENCOUNTER — HOSPITAL ENCOUNTER (OUTPATIENT)
Age: 35
Discharge: HOME | End: 2020-01-15
Attending: EMERGENCY MEDICINE
Payer: MEDICAID

## 2020-01-15 ENCOUNTER — OFFICE VISIT (OUTPATIENT)
Dept: FAMILY MEDICINE CLINIC | Facility: CLINIC | Age: 35
End: 2020-01-15
Payer: OTHER MISCELLANEOUS

## 2020-01-15 VITALS
WEIGHT: 182 LBS | HEIGHT: 64 IN | BODY MASS INDEX: 31.07 KG/M2 | TEMPERATURE: 98 F | DIASTOLIC BLOOD PRESSURE: 58 MMHG | HEART RATE: 91 BPM | RESPIRATION RATE: 14 BRPM | SYSTOLIC BLOOD PRESSURE: 113 MMHG | OXYGEN SATURATION: 100 %

## 2020-01-15 VITALS
BODY MASS INDEX: 29.25 KG/M2 | DIASTOLIC BLOOD PRESSURE: 73 MMHG | HEIGHT: 66 IN | WEIGHT: 182 LBS | SYSTOLIC BLOOD PRESSURE: 112 MMHG | HEART RATE: 92 BPM

## 2020-01-15 DIAGNOSIS — M54.59 INTRACTABLE LOW BACK PAIN: ICD-10-CM

## 2020-01-15 DIAGNOSIS — R51.9 HEADACHE DISORDER: ICD-10-CM

## 2020-01-15 DIAGNOSIS — M54.50 CHRONIC LOW BACK PAIN, UNSPECIFIED BACK PAIN LATERALITY, UNSPECIFIED WHETHER SCIATICA PRESENT: ICD-10-CM

## 2020-01-15 DIAGNOSIS — S93.402A SPRAIN OF LEFT ANKLE, UNSPECIFIED LIGAMENT, INITIAL ENCOUNTER: Primary | ICD-10-CM

## 2020-01-15 DIAGNOSIS — M25.572 ACUTE LEFT ANKLE PAIN: Primary | ICD-10-CM

## 2020-01-15 DIAGNOSIS — G89.29 CHRONIC LOW BACK PAIN, UNSPECIFIED BACK PAIN LATERALITY, UNSPECIFIED WHETHER SCIATICA PRESENT: ICD-10-CM

## 2020-01-15 PROCEDURE — 99212 OFFICE O/P EST SF 10 MIN: CPT

## 2020-01-15 PROCEDURE — 99213 OFFICE O/P EST LOW 20 MIN: CPT | Performed by: NURSE PRACTITIONER

## 2020-01-15 PROCEDURE — E0114 CRUTCH UNDERARM PAIR NO WOOD: HCPCS | Performed by: NURSE PRACTITIONER

## 2020-01-15 PROCEDURE — 99212 OFFICE O/P EST SF 10 MIN: CPT | Performed by: NURSE PRACTITIONER

## 2020-01-15 RX ORDER — GABAPENTIN 300 MG/1
300 CAPSULE ORAL 3 TIMES DAILY
Refills: 0 | COMMUNITY
Start: 2019-11-14

## 2020-01-15 RX ORDER — MORPHINE SULFATE 15 MG/1
15 TABLET ORAL
COMMUNITY
Start: 2018-07-12 | End: 2020-05-22

## 2020-01-15 RX ORDER — HYDROCODONE BITARTRATE AND ACETAMINOPHEN 10; 325 MG/1; MG/1
1 TABLET ORAL EVERY 4 HOURS PRN
COMMUNITY
Start: 2019-12-23 | End: 2021-06-19

## 2020-01-15 RX ORDER — FLUTICASONE PROPIONATE 50 MCG
SPRAY, SUSPENSION (ML) NASAL
COMMUNITY

## 2020-01-15 RX ORDER — IBUPROFEN 800 MG/1
800 TABLET ORAL EVERY 8 HOURS PRN
Qty: 90 TABLET | Refills: 0 | Status: SHIPPED | OUTPATIENT
Start: 2020-01-15 | End: 2020-02-14

## 2020-01-15 RX ORDER — AMOXICILLIN AND CLAVULANATE POTASSIUM 875; 125 MG/1; MG/1
TABLET, FILM COATED ORAL
COMMUNITY
End: 2020-05-22 | Stop reason: ALTCHOICE

## 2020-01-15 RX ORDER — IBUPROFEN 800 MG/1
800 TABLET ORAL 2 TIMES DAILY PRN
COMMUNITY

## 2020-01-15 RX ORDER — METRONIDAZOLE 500 MG/1
TABLET ORAL
COMMUNITY
End: 2020-05-22 | Stop reason: ALTCHOICE

## 2020-01-15 RX ORDER — AZITHROMYCIN 250 MG/1
TABLET, FILM COATED ORAL
COMMUNITY
End: 2020-05-22 | Stop reason: ALTCHOICE

## 2020-01-15 NOTE — PATIENT INSTRUCTIONS
RICE     Rest an injury, elevate it, and use ice and compression as directed. RICE stands for rest, ice, compression, and elevation. These can limit pain and swelling after an injury.  RICE may be recommended to help treat breaks (fractures), sprains, s · Injured body part becomes cold, blue, numb, or tingly  · Signs of infection. These include warmth in the skin, redness, drainage, or bad smell coming from the injured body part. Date Last Reviewed: 6/1/2018  © 4901-3658 [employer] [address].  800 To

## 2020-01-15 NOTE — ED NOTES
Evaluation per Dr. Sara Mauricio. Discussion about Narcotic pain meds and meds for pain discussed and pt was inst to f/u with her scheduled pain clinic for prescription pain meds.  Denies being on meds for pain as on medication pain list. Asking to see 'Head Doctor\"

## 2020-01-15 NOTE — ED PROVIDER NOTES
Patient Seen in: [facility] In [address] [place] [address]      History   Patient presents with:  Fall    Stated Complaint: Work-Comp/ Fall    HPI       Patient presents for left ankle pain and right sided headache x 1 week.   Patient fell going up the Pupils are equal, round, and reactive to light. Neck: Neck supple. Cardiovascular: Normal rate, regular rhythm, normal heart sounds and intact distal pulses. Pulmonary/Chest: Effort normal and breath sounds normal. No respiratory distress.    Abdomin

## 2020-01-15 NOTE — PROGRESS NOTES
HPI    Patient presents for left ankle pain and right sided headache x 1 week. Patient fell going up the stairs on 1/8; hit her head and started seeing stars which come and go.   Also with a history of back pain x 1-2 years that she feels is worsening afte Worry: Not on file        Inability: Not on file      Transportation needs:        Medical: Not on file        Non-medical: Not on file    Tobacco Use      Smoking status: Former Smoker      Smokeless tobacco: Former User    Substance and Sexual Activi  MG Oral Tab      • ibuprofen 800 MG Oral Tab ibuprofen 800 mg tablet     • metRONIDAZOLE 500 MG Oral Tab metronidazole 500 mg tablet     • morphINE Sulfate IR 15 MG Oral Tab Take 15 mg by mouth.      • ibuprofen 800 MG Oral Tab Take 1 tablet (800 mg Right eye exhibits no discharge. Left eye exhibits no discharge. Neck: Normal range of motion. Neck supple. Cardiovascular: Normal rate, regular rhythm and normal heart sounds. No murmur heard.   Pulmonary/Chest: Effort normal and breath sounds amira

## 2020-01-15 NOTE — ED INITIAL ASSESSMENT (HPI)
FELL 1/8/2020 and was seen and treated at [address] [place] [address] with xrays and ct. Here now because she continues to have left ankle pain and rt ha which she sees stars. . for one week. Back pain for 1-2 years and hurting her after the fall.

## 2020-02-06 NOTE — PROGRESS NOTES
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 right handed male     Diagnosed with MS in 1985   maintained on Rebif and doing well   States he has been stable for since 2005    MRIs in spring of 2014 -- stable and unchanged at [facility] in the fall of 2016 and never completed -- hesitant to complete ($)    Labs obtained by PCP - Jan 2020    tolerating Rebif well   No longer an [employer] for the company    No new medical or neuro issues    No chest pain or palpitations  No SOB  No vertigo, lightheadedness or loss of consciousness  No falls, tripping or stumbling  No incontinence of bowels or bladder  No itching or bruising appreciated  No numbness, tingling     ROS otherwise negative     Prior to Visit Medications    Medication Sig Taking? Authorizing Provider   alfuzosin (UROXATRAL) 10 MG extended release tablet TAKE 1 TABLET BY MOUTH EVERYDAY AT BEDTIME Yes Historical Provider, MD   LORazepam (ATIVAN) 1 MG tablet Take 1 tablet by mouth 3 times daily for 30 days. Yes Mague Bucradha, DO   amLODIPine (NORVASC) 2.5 MG tablet Take 1 tablet by mouth daily Yes Mague Bucradha, DO   escitalopram (LEXAPRO) 20 MG tablet Take 1 tablet by mouth daily Yes Mague Erazo, DO   REBIF REBIDOSE 44 MCG/0.5ML SOAJ INJECT ONE PEN (44 MCG) SUBCUTANEOUSLY THREE TIMES PER WEEK. KEEP REFRIGERATED.  Yes Delila Meigs, APRN - CNS   Diphenhydramine-APAP, sleep, (TYLENOL PM EXTRA STRENGTH PO) Take 1 tablet by mouth every evening Yes Historical Provider, MD   tadalafil (CIALIS) 5 MG tablet Take 5 mg by mouth daily  Historical Provider, MD     Allergies as of 02/07/2020 - Review Complete 02/07/2020   Allergen Reaction Noted    Codeine  04/26/2013       Objective:     /82 (Site: Right Upper Arm, Position: Sitting, Cuff Size: Medium Adult)   Pulse 68   Resp 18   Ht 5' 11\" (1.803 m)   Wt 249 lb (112.9 kg)   BMI 34.73 kg/m²   Afebrile      General appearance: alert, appears stated age, cooperative and mildly obese  Head: Normocephalic, Onset   • Hypertension Mother    • Migraines Mother         Smoking Status: Former Smoker                   Packs/Day: 0.00  Years:         Alcohol Use: No                    REVIEW OF SYSTEMS:   A 12 point review of systems was performed as documented on without obvious abnormality, atraumatic  Neck: no adenopathy, no carotid bruit and limited ROM  Lungs: clear to auscultation bilaterally  Heart: regular rate and rhythm  Extremities: no cyanosis or clubbing   Pulses: 2+ and symmetric  Skin: no rashes or lesions      Mental Status: alert and oriented to person place and time    Speech: clear  Language: normal    Cranial Nerves:  I: smell    II: visual acuity     II: visual fields Full   II: pupils ANTONIO   III,VII: ptosis    III,IV,VI: extraocular muscles  Full ROM   V: mastication Normal   V: facial light touch sensation  Splits at midline with tuning fork and PP sensation on forehead    V,VII: corneal reflex  Present   VII: facial muscle function - upper     VII: facial muscle function - lower Normal   VIII: hearing Normal   IX: soft palate elevation  Normal   IX,X: gag reflex Present   XI: trapezius strength  5/5   XI: sternocleidomastoid strength 5/5   XI: neck extension strength  5/5   XII: tongue strength  Normal     No ANNA  No red desaturation     Motor:  5/5 throughout  Sudden giving way left side both arm and leg   Normal bulk and tone    Sensory:  Decreased LT, PP and vibration left arm and leg   Splits at midline on forehead    Coordination:   FN, FFM and NANY decreased left --  histrionics remain present     Gait:  Left hemiparetic     DTR:   Right Brachioradialis reflex 2+  Left Brachioradialis reflex 2+  Right Biceps reflex 2+  Left Biceps reflex 2+  Right Quadriceps reflex 2+  Left Quadriceps reflex 2+  Right Achilles reflex 1+  Left Achilles reflex 1+     No Barrera's     Laboratory/Radiology:     CBC with Differential:    Lab Results   Component Value Date    WBC 6.4 01/17/2020    RBC 6.34 01/17/2020    HGB 11.3 01/17/2020    HCT 39.1 01/17/2020     01/17/2020    MCV 61.7 01/17/2020    MCH 17.8 01/17/2020    MCHC 28.9 01/17/2020    RDW 19.3 01/17/2020    NRBC 0.9 01/17/2020    SEGSPCT 65 10/17/2011    LYMPHOPCT 26.3 01/17/2020    MONOPCT 11.4 01/17/2020    MYELOPCT 0.9 08/12/2019    BASOPCT 0.6 01/17/2020    MONOSABS 0.70 01/17/2020    LYMPHSABS 1.73 01/17/2020    EOSABS 0.22 01/17/2020    BASOSABS 0.00 01/17/2020     CMP:    Lab Results   Component Value Date     01/17/2020    K 4.7 01/17/2020     01/17/2020    CO2 23 01/17/2020    BUN 19 01/17/2020    CREATININE 0.9 01/17/2020    GFRAA >60 01/17/2020    LABGLOM >60 01/17/2020    GLUCOSE 94 01/17/2020    GLUCOSE 98 11/21/2010    PROT 7.6 01/17/2020    LABALBU 4.2 01/17/2020    LABALBU 4.1 11/21/2010    CALCIUM 9.3 01/17/2020    BILITOT 0.6 01/17/2020    ALKPHOS 92 01/17/2020    AST 24 01/17/2020    ALT 26 01/17/2020     MRIs reviewed from May 2014 ([facility])    Stable -- has refused repeated images for the past year -- now agreeable to imaging in spring    MRIs ordered in the fall (2016) but did not complete     Labs reviewed from Jan 2020 personally at this time     Assessment:     RRMS   Stable neurologically -- exam remains unchanged     EDSS ?  --- histrionics remain present     Anxiety/depression -- stable reportedly    Elevated BP -- PCP appt this am     Plan:     Continue Rebif    Discussed obtaining MRIs again   He remains heistant due to costs     RTO 6 months    I spent 25 minutes with the patient, with 50% or more counseling them on their diagnosis, diagnostic workup and treatment options.      Lizzie Oppenheim  8:20 AM  2/7/2020 with negative Barron testing. There is no effusion. There is full composite motion. She and no triggering or tenderness to her A1 pulley.   The patient has a negative Spurling's test and Hooker's test.  The patient has a negative carpal tunnel compression

## 2020-02-29 ENCOUNTER — MED REC SCAN ONLY (OUTPATIENT)
Dept: FAMILY MEDICINE CLINIC | Facility: CLINIC | Age: 35
End: 2020-02-29

## 2020-03-30 ENCOUNTER — TELEPHONE (OUTPATIENT)
Dept: FAMILY MEDICINE CLINIC | Facility: CLINIC | Age: 35
End: 2020-03-30

## 2020-03-30 NOTE — TELEPHONE ENCOUNTER
No I cannot, but she can obtain this over-the-counter at the store of her choice.   She probably will not be able to get this at a health food store as they may not be deemed as essential.  She can buy this at [facility] or [address] [place] [address].

## 2020-03-30 NOTE — TELEPHONE ENCOUNTER
Spoke to patient in regards Vitamin C.  Patient states that she will try going to [facility]

## 2020-03-30 NOTE — TELEPHONE ENCOUNTER
Sandy Bearden would like to know if Dr. Jean Pierre Ashley can prescribe her a high dose of Vitamin C. She has been doing research and feels that she needs it. Please let Sandy Bearden know.

## 2020-04-01 ENCOUNTER — MED REC SCAN ONLY (OUTPATIENT)
Dept: FAMILY MEDICINE CLINIC | Facility: CLINIC | Age: 35
End: 2020-04-01

## 2020-05-22 ENCOUNTER — OFFICE VISIT (OUTPATIENT)
Dept: FAMILY MEDICINE CLINIC | Facility: CLINIC | Age: 35
End: 2020-05-22
Payer: MEDICAID

## 2020-05-22 ENCOUNTER — TELEPHONE (OUTPATIENT)
Dept: FAMILY MEDICINE CLINIC | Facility: CLINIC | Age: 35
End: 2020-05-22

## 2020-05-22 VITALS
WEIGHT: 203 LBS | SYSTOLIC BLOOD PRESSURE: 107 MMHG | RESPIRATION RATE: 17 BRPM | HEIGHT: 64 IN | HEART RATE: 99 BPM | TEMPERATURE: 97 F | BODY MASS INDEX: 34.66 KG/M2 | DIASTOLIC BLOOD PRESSURE: 71 MMHG

## 2020-05-22 DIAGNOSIS — B96.89 BV (BACTERIAL VAGINOSIS): Primary | ICD-10-CM

## 2020-05-22 DIAGNOSIS — F41.9 ANXIETY: ICD-10-CM

## 2020-05-22 DIAGNOSIS — Z11.3 ROUTINE SCREENING FOR STI (SEXUALLY TRANSMITTED INFECTION): ICD-10-CM

## 2020-05-22 DIAGNOSIS — N89.8 VAGINAL DISCHARGE: Primary | ICD-10-CM

## 2020-05-22 DIAGNOSIS — N76.0 BV (BACTERIAL VAGINOSIS): Primary | ICD-10-CM

## 2020-05-22 DIAGNOSIS — Z12.4 ENCOUNTER FOR PAPANICOLAOU SMEAR OF CERVIX: ICD-10-CM

## 2020-05-22 DIAGNOSIS — Z00.00 ENCOUNTER FOR PREVENTIVE CARE: ICD-10-CM

## 2020-05-22 PROCEDURE — 99213 OFFICE O/P EST LOW 20 MIN: CPT | Performed by: FAMILY MEDICINE

## 2020-05-22 PROCEDURE — 3078F DIAST BP <80 MM HG: CPT | Performed by: FAMILY MEDICINE

## 2020-05-22 PROCEDURE — 99395 PREV VISIT EST AGE 18-39: CPT | Performed by: FAMILY MEDICINE

## 2020-05-22 PROCEDURE — 3008F BODY MASS INDEX DOCD: CPT | Performed by: FAMILY MEDICINE

## 2020-05-22 PROCEDURE — 3074F SYST BP LT 130 MM HG: CPT | Performed by: FAMILY MEDICINE

## 2020-05-22 RX ORDER — METHOCARBAMOL 750 MG/1
750 TABLET, FILM COATED ORAL 4 TIMES DAILY PRN
COMMUNITY
Start: 2020-05-19

## 2020-05-22 RX ORDER — ALPRAZOLAM 1 MG/1
1 TABLET ORAL
COMMUNITY
Start: 2020-01-16 | End: 2020-05-22

## 2020-05-22 RX ORDER — ALPRAZOLAM 1 MG/1
1 TABLET ORAL NIGHTLY PRN
Qty: 30 TABLET | Refills: 0 | Status: SHIPPED | OUTPATIENT
Start: 2020-05-22 | End: 2020-06-30

## 2020-05-22 RX ORDER — METRONIDAZOLE 500 MG/1
TABLET ORAL
Qty: 4 TABLET | Refills: 0 | Status: SHIPPED | OUTPATIENT
Start: 2020-05-22 | End: 2020-07-14 | Stop reason: ALTCHOICE

## 2020-05-22 RX ORDER — ALPRAZOLAM 1 MG/1
1 TABLET ORAL 2 TIMES DAILY PRN
Qty: 60 TABLET | Refills: 0 | Status: SHIPPED | OUTPATIENT
Start: 2020-05-22 | End: 2020-05-22

## 2020-05-22 NOTE — TELEPHONE ENCOUNTER
Calvin/ Pharmacist of [address] [place] [address] [address] is calling to inform Dr. Eugenia Albarado that she received script for ALPRAZolam 1 MG Oral Tab, however, wanted to let the doctor know that patient is taking Norco and other muscle relaxants prescribed by other doctors.

## 2020-05-22 NOTE — TELEPHONE ENCOUNTER
Spoke with MATHIEU. Informed of message below. MATHIEU will cancel the order on her end since the newer script was -prescribed to [facility]. MATHIEU will contact patient and update her.

## 2020-05-22 NOTE — TELEPHONE ENCOUNTER
Terrie Schilling Pharmacist from outpatient pharmacy at [facility] contacting office. Pharmacy wanted to make sure you were aware of other medications that the patient is also taking:  Norco, Gabapentin and Methocarbamol from pain clinic. Pharmacy is concerned about CNS depression. [address] will hold on filling Xanax and wait for your response.

## 2020-05-22 NOTE — TELEPHONE ENCOUNTER
Prescription adjusted to be only at night as needed with a quantity of 30. I sent it to the pharmacy. You can call the pharmacy and let them know.

## 2020-05-22 NOTE — PATIENT INSTRUCTIONS
Medications reviewed and renewed. Xanax refilled to be taken twice daily as needed for anxiety. All preventive care has been provided today including Pap smear. Encouraged physical fitness and daily physical activity daily.   Recommend weight loss via da

## 2020-05-22 NOTE — PROGRESS NOTES
HPI:    Patient ID: Shien Parents is a 37year old female.     Patient is a 53-year-old -American female here for complete preventive care physical and for status update on any confirmed chronic medical illnesses and follow up on any previous labs or 05/22/20  0817   BP: 107/71   Pulse: 99   Resp: 17   Temp: 97.4 °F (36.3 °C)       PHYSICAL EXAM:   Physical Exam    Constitutional: She is oriented to person, place, and time. She appears well-developed and well-nourished.    HENT:   Right Ear: Tympanic me ThinPrep PAP with HPV Reflex Request      Chlamydia/Gc Amplification [E]      Gentital vaginosis screen [E]      Meds This Visit:  Requested Prescriptions     Signed Prescriptions Disp Refills   • ALPRAZolam 1 MG Oral Tab 60 tablet 0     Sig: Take 1 tab

## 2020-06-29 DIAGNOSIS — F41.9 ANXIETY: ICD-10-CM

## 2020-06-30 RX ORDER — ALPRAZOLAM 1 MG/1
TABLET ORAL
Qty: 30 TABLET | Refills: 0 | Status: SHIPPED | OUTPATIENT
Start: 2020-06-30 | End: 2020-09-16

## 2020-07-13 ENCOUNTER — NURSE TRIAGE (OUTPATIENT)
Dept: FAMILY MEDICINE CLINIC | Facility: CLINIC | Age: 35
End: 2020-07-13

## 2020-07-13 ENCOUNTER — TELEPHONE (OUTPATIENT)
Dept: FAMILY MEDICINE CLINIC | Facility: CLINIC | Age: 35
End: 2020-07-13

## 2020-07-13 DIAGNOSIS — G89.29 CHRONIC LOW BACK PAIN, UNSPECIFIED BACK PAIN LATERALITY, UNSPECIFIED WHETHER SCIATICA PRESENT: Primary | ICD-10-CM

## 2020-07-13 DIAGNOSIS — M54.50 CHRONIC LOW BACK PAIN, UNSPECIFIED BACK PAIN LATERALITY, UNSPECIFIED WHETHER SCIATICA PRESENT: Primary | ICD-10-CM

## 2020-07-13 RX ORDER — HYDROCODONE BITARTRATE AND ACETAMINOPHEN 7.5; 325 MG/1; MG/1
TABLET ORAL
COMMUNITY
Start: 2020-06-19 | End: 2020-07-13

## 2020-07-13 NOTE — TELEPHONE ENCOUNTER
Patient called, requesting refills for the following medication.     HYDROcodone-acetaminophen  MG Oral Tab    gabapentin 300 MG Oral Cap    methocarbamol 750 MG Oral Tab

## 2020-07-13 NOTE — TELEPHONE ENCOUNTER
Spoke to patient who requested message be disregarded since she is scheduled with Dr. Maria Del Carmen Michele tomorrow. Patient will request at that time.

## 2020-07-13 NOTE — TELEPHONE ENCOUNTER
Patient states she will discuss medications with Dr. Augusto Troy at [address] appointment.

## 2020-07-13 NOTE — TELEPHONE ENCOUNTER
Action Requested: Summary for Provider     []  Critical Lab, Recommendations Needed  [] Need Additional Advice  []   FYI    []   Need Orders  [] Need Medications Sent to Pharmacy  []  Other     SUMMARY: Patient calling stating she has been experiencing epi

## 2020-07-14 ENCOUNTER — TELEPHONE (OUTPATIENT)
Dept: FAMILY MEDICINE CLINIC | Facility: CLINIC | Age: 35
End: 2020-07-14

## 2020-07-14 ENCOUNTER — OFFICE VISIT (OUTPATIENT)
Dept: FAMILY MEDICINE CLINIC | Facility: CLINIC | Age: 35
End: 2020-07-14
Payer: MEDICAID

## 2020-07-14 VITALS
SYSTOLIC BLOOD PRESSURE: 120 MMHG | OXYGEN SATURATION: 96 % | HEART RATE: 87 BPM | WEIGHT: 201.25 LBS | BODY MASS INDEX: 34.36 KG/M2 | DIASTOLIC BLOOD PRESSURE: 77 MMHG | HEIGHT: 64 IN | TEMPERATURE: 99 F

## 2020-07-14 DIAGNOSIS — M54.9 CHRONIC BACK PAIN, UNSPECIFIED BACK LOCATION, UNSPECIFIED BACK PAIN LATERALITY: ICD-10-CM

## 2020-07-14 DIAGNOSIS — G89.29 CHRONIC BACK PAIN, UNSPECIFIED BACK LOCATION, UNSPECIFIED BACK PAIN LATERALITY: ICD-10-CM

## 2020-07-14 DIAGNOSIS — J40 BRONCHITIS: Primary | ICD-10-CM

## 2020-07-14 DIAGNOSIS — R06.83 SNORING: ICD-10-CM

## 2020-07-14 PROCEDURE — 99214 OFFICE O/P EST MOD 30 MIN: CPT | Performed by: FAMILY MEDICINE

## 2020-07-14 RX ORDER — PREDNISONE 20 MG/1
20 TABLET ORAL DAILY
COMMUNITY
End: 2020-09-10

## 2020-07-14 RX ORDER — HYDROCODONE BITARTRATE AND ACETAMINOPHEN 10; 325 MG/1; MG/1
TABLET ORAL
Refills: 0 | Status: CANCELLED | OUTPATIENT
Start: 2020-07-14

## 2020-07-14 RX ORDER — GABAPENTIN 300 MG/1
CAPSULE ORAL
Refills: 0 | Status: CANCELLED | OUTPATIENT
Start: 2020-07-14

## 2020-07-14 NOTE — PROGRESS NOTES
HPI:    Wilda Nieto is a 37year old female presents to clinic for ER follow-up. Patient was seen early this morning with concerns regarding a cough and shortness of breath. She has a history of asthma. COVID test was negative.   Chest x-ray was done fluticasone propionate 50 mcg/actuation nasal spray,suspension     • gabapentin 300 MG Oral Cap   0   • HYDROcodone-acetaminophen  MG Oral Tab      • ibuprofen 800 MG Oral Tab ibuprofen 800 mg tablet         Allergies:    Flexeril [Cyclobenz*    HIVE Orders This Visit:  No orders of the defined types were placed in this encounter.       Meds This Visit:  Requested Prescriptions     Signed Prescriptions Disp Refills   • VENTOLIN  (90 Base) MCG/ACT Inhalation Aero Soln 1 Inhaler 3     Sig:

## 2020-07-15 ENCOUNTER — TELEPHONE (OUTPATIENT)
Dept: FAMILY MEDICINE CLINIC | Facility: CLINIC | Age: 35
End: 2020-07-15

## 2020-07-15 NOTE — TELEPHONE ENCOUNTER
Patient calling and asking can her referral be faxed over to  Workers [employer]           The one for Dr.. Jamal Banda pain management     Fax attn: [phone] attn: marina    Please advise   [phone]

## 2020-07-15 NOTE — TELEPHONE ENCOUNTER
I spoke with the patient and I told her that I agree with the physiatry referral that my partner initiated on July 14, 2020. All pain medications that are controlled substances regarding this particular patient I will not be filling.   It will have to come

## 2020-07-16 ENCOUNTER — TELEPHONE (OUTPATIENT)
Dept: PAIN CLINIC | Facility: HOSPITAL | Age: 35
End: 2020-07-16

## 2020-07-16 NOTE — TELEPHONE ENCOUNTER
PT is new and is a  W/C, she would like to know if her  has sent over her med recs and approval letter.

## 2020-07-21 ENCOUNTER — NURSE TRIAGE (OUTPATIENT)
Dept: FAMILY MEDICINE CLINIC | Facility: CLINIC | Age: 35
End: 2020-07-21

## 2020-07-21 DIAGNOSIS — R39.9 UTI SYMPTOMS: ICD-10-CM

## 2020-07-21 DIAGNOSIS — B37.3 YEAST VAGINITIS: Primary | ICD-10-CM

## 2020-07-21 RX ORDER — CIPROFLOXACIN 250 MG/1
250 TABLET, FILM COATED ORAL 2 TIMES DAILY
Qty: 20 TABLET | Refills: 0 | Status: SHIPPED | OUTPATIENT
Start: 2020-07-21 | End: 2020-07-31

## 2020-07-21 RX ORDER — FLUCONAZOLE 150 MG/1
150 TABLET ORAL ONCE
Qty: 1 TABLET | Refills: 0 | Status: SHIPPED | OUTPATIENT
Start: 2020-07-21 | End: 2020-07-21

## 2020-07-21 NOTE — TELEPHONE ENCOUNTER
This nurse confirmed with Dr. Janet Johnson that the patient is suppose to be on the Ciprofloxacin for 3 days. This nurse spoke with Adele Betancur from [facility] and informed him the patient is only suppose to take the Ciprofloxacin for 3 days.  This nurse spo

## 2020-07-21 NOTE — TELEPHONE ENCOUNTER
Instruct the patient to increase water intake. Cipro 250 mg sent to the pharmacy to be taken twice a day for 3 days. Patient also given a prescription for Diflucan tablets to be taken after the antibiotics have been completed.

## 2020-07-21 NOTE — TELEPHONE ENCOUNTER
Action Requested: Summary for Provider     []  Critical Lab, Recommendations Needed  [x] Need Additional Advice  []   FYI    []   Need Orders  [] Need Medications Sent to Pharmacy  []  Other     SUMMARY: Patient requesting antibiotic for UTI reports since

## 2020-07-24 ENCOUNTER — TELEPHONE (OUTPATIENT)
Dept: CASE MANAGEMENT | Age: 35
End: 2020-07-24

## 2020-07-24 DIAGNOSIS — R06.83 SNORING: Primary | ICD-10-CM

## 2020-07-24 NOTE — TELEPHONE ENCOUNTER
Terence Garcia,    [facility] did not approve a diagnostic sleep study, but they did approve an at home sleep study. If you agree please put an order in for an at home sleep study and advise patient of the change.     Thank you  Andrade Hollingsworth

## 2020-07-24 NOTE — TELEPHONE ENCOUNTER
Spoke to patient in regards referral in place for an in home sleep study. Patient verbalized understanding.

## 2020-08-03 ENCOUNTER — TELEPHONE (OUTPATIENT)
Dept: FAMILY MEDICINE CLINIC | Facility: CLINIC | Age: 35
End: 2020-08-03

## 2020-08-03 NOTE — TELEPHONE ENCOUNTER
Hello,    Managed care does not do prior auth for PT for [employer] P[employer] Comm. I believe the facility obtains auth for the PT if Virl August is needed.     Thank you  Inessa Luna

## 2020-08-03 NOTE — TELEPHONE ENCOUNTER
Patient called, because she was told by the pain clinic that her referral needs to be reworked under her insurance instead of workman's comp.

## 2020-08-04 ENCOUNTER — TELEPHONE (OUTPATIENT)
Dept: PAIN CLINIC | Facility: HOSPITAL | Age: 35
End: 2020-08-04

## 2020-08-04 NOTE — TELEPHONE ENCOUNTER
PT called around the 14th or 15th of July stating that her W/C  would fax over med recs and approval letter. Today she is stating that her PCP put in a referral in [facility] and her med recs were in the system. She stated that her case was dropped because the W/C Dr. Dago Centeno no longer prescribe meds for her. Keep me posted on how to proceed.

## 2020-08-08 NOTE — TELEPHONE ENCOUNTER
Spoke with Jada Bravo, She stated new referral needed for pain management. I will call [phone] on 8/10 to verify.  Pt notified

## 2020-08-10 NOTE — TELEPHONE ENCOUNTER
I called the pain management clinic and they state a new referral is not needed. Spoke with Colin James and notified her that her referral from 7/14 is good. No new referral needed. Patient verbalized understanding.  No further action needed

## 2020-08-26 ENCOUNTER — TELEPHONE (OUTPATIENT)
Dept: FAMILY MEDICINE CLINIC | Facility: CLINIC | Age: 35
End: 2020-08-26

## 2020-08-26 DIAGNOSIS — M54.50 CHRONIC RIGHT-SIDED LOW BACK PAIN WITHOUT SCIATICA: Primary | ICD-10-CM

## 2020-08-26 DIAGNOSIS — M79.671 FOOT PAIN, RIGHT: ICD-10-CM

## 2020-08-26 DIAGNOSIS — G89.29 CHRONIC RIGHT-SIDED LOW BACK PAIN WITHOUT SCIATICA: Primary | ICD-10-CM

## 2020-08-26 NOTE — TELEPHONE ENCOUNTER
Patient calling reports called pain clinic to make an appt and was told referral was cancelled   Referral is in Epic with 6 visits     Called pain clinic ,, spoke with [facility] , pt needs a new referral current one is listed as cancelled     Called patient

## 2020-09-10 ENCOUNTER — OFFICE VISIT (OUTPATIENT)
Dept: FAMILY MEDICINE CLINIC | Facility: CLINIC | Age: 35
End: 2020-09-10
Payer: MEDICAID

## 2020-09-10 ENCOUNTER — OFFICE VISIT (OUTPATIENT)
Dept: PAIN CLINIC | Facility: HOSPITAL | Age: 35
End: 2020-09-10
Attending: FAMILY MEDICINE
Payer: MEDICAID

## 2020-09-10 VITALS
HEART RATE: 88 BPM | BODY MASS INDEX: 33.29 KG/M2 | HEIGHT: 64 IN | TEMPERATURE: 97 F | SYSTOLIC BLOOD PRESSURE: 112 MMHG | WEIGHT: 195 LBS | DIASTOLIC BLOOD PRESSURE: 72 MMHG | RESPIRATION RATE: 17 BRPM

## 2020-09-10 VITALS
WEIGHT: 201 LBS | DIASTOLIC BLOOD PRESSURE: 73 MMHG | SYSTOLIC BLOOD PRESSURE: 107 MMHG | RESPIRATION RATE: 18 BRPM | HEART RATE: 102 BPM | HEIGHT: 64 IN | BODY MASS INDEX: 34.31 KG/M2

## 2020-09-10 DIAGNOSIS — R10.9 ACUTE RIGHT FLANK PAIN: ICD-10-CM

## 2020-09-10 DIAGNOSIS — G89.29 CHRONIC RIGHT-SIDED LOW BACK PAIN WITHOUT SCIATICA: ICD-10-CM

## 2020-09-10 DIAGNOSIS — R11.0 NAUSEA: ICD-10-CM

## 2020-09-10 DIAGNOSIS — M54.50 CHRONIC RIGHT-SIDED LOW BACK PAIN WITHOUT SCIATICA: ICD-10-CM

## 2020-09-10 DIAGNOSIS — N20.1 RIGHT URETERAL STONE: Primary | ICD-10-CM

## 2020-09-10 PROCEDURE — 3078F DIAST BP <80 MM HG: CPT | Performed by: FAMILY MEDICINE

## 2020-09-10 PROCEDURE — 99214 OFFICE O/P EST MOD 30 MIN: CPT | Performed by: FAMILY MEDICINE

## 2020-09-10 PROCEDURE — 99201 HC OUTPT EVAL AND MGNT NEW PT LEVEL 1: CPT

## 2020-09-10 PROCEDURE — 3074F SYST BP LT 130 MM HG: CPT | Performed by: FAMILY MEDICINE

## 2020-09-10 PROCEDURE — 3008F BODY MASS INDEX DOCD: CPT | Performed by: FAMILY MEDICINE

## 2020-09-10 RX ORDER — TAMSULOSIN HYDROCHLORIDE 0.4 MG/1
0.4 CAPSULE ORAL DAILY
COMMUNITY
Start: 2020-09-03 | End: 2021-06-19

## 2020-09-10 RX ORDER — ONDANSETRON 4 MG/1
TABLET, ORALLY DISINTEGRATING ORAL
COMMUNITY
Start: 2020-08-19 | End: 2021-06-19

## 2020-09-10 RX ORDER — HYDROCODONE BITARTRATE AND ACETAMINOPHEN 10; 325 MG/1; MG/1
1 TABLET ORAL EVERY 6 HOURS PRN
Qty: 40 TABLET | Refills: 0 | Status: SHIPPED | OUTPATIENT
Start: 2020-09-10 | End: 2020-09-16

## 2020-09-10 RX ORDER — ONDANSETRON 4 MG/1
4 TABLET, FILM COATED ORAL EVERY 8 HOURS PRN
Qty: 20 TABLET | Refills: 2 | Status: SHIPPED | OUTPATIENT
Start: 2020-09-10

## 2020-09-10 NOTE — CHRONIC PAIN
[facility]  Pain Consultation     HISTORY OF PRESENT ILLNESS:  Keanu Berg is a 37year old old female referred to the pain clinic by Dr. Estee Romero for Chronic right-sided low back pain without sciatica which began initially after a fall i MEDICATIONS:  None    CURRENT MEDICATIONS:  Current Outpatient Medications   Medication Sig Dispense Refill   • tamsulosin (FLOMAX) cap Take 0.4 mg by mouth daily.      • ondansetron 4 MG Oral Tablet Dispersible      • VENTOLIN  (90 Base) MCG/ACT Inh Negative  Hematologic: No active bleeding  Lymphatic: No current lymphedema  Allergic/Immunologic:  Negative  Musculoskeletal: As above  Neurological: As above    MEDICAL HISTORY:  Patient Active Problem List:     S/P      Anemia     Pain, low cecily violence:        Fear of current or ex partner: Not on file        Emotionally abused: Not on file        Physically abused: Not on file        Forced sexual activity: Not on file    Other Topics      Concerns:         Service: Not Asked        Blo SIJ tenderness negative    SLR: negative  Debra's test: negative    Right Knee Deep tendon reflexes: Normal   Right Ankle Deep tendon reflexes: Normal     Left Knee Deep tendon reflexes: Normal   Left Ankle Deep tendon reflexes: Normal     IMAGING: to face time, time spent reviewing data, obtaining patient information and discussing the care with the patients health care providers. Pt will return to clinic follow-up after right kidney stone is addressed.

## 2020-09-10 NOTE — PROGRESS NOTES
HPI:    Patient ID: Keanu Berg is a 37year old female. Patient is a 29-year-old -American female who presents today to follow-up with persistent flank/right lower abdominal pain secondary to confirmed ureteral stone.   The patient was admitted • ibuprofen 800 MG Oral Tab Take 800 mg by mouth 2 (two) times daily as needed. • methocarbamol 750 MG Oral Tab Take 750 mg by mouth 4 (four) times daily as needed. • gabapentin 300 MG Oral Cap 300 mg 3 (three) times daily.     0     Allergies:  C • HYDROcodone-acetaminophen (NORCO)  MG Oral Tab 40 tablet 0     Sig: Take 1 tablet by mouth every 6 (six) hours as needed for Pain.    • Ondansetron HCl (ZOFRAN) 4 mg tablet 20 tablet 2     Sig: Take 1 tablet (4 mg total) by mouth every 8 (eight) chela

## 2020-09-10 NOTE — PATIENT INSTRUCTIONS
Medication reviewed and renewed where needed and appropriate. Pain management via prescription medications as needed. Comply with medications. Monitor blood pressures and record at home. Limit salt intake. To urology ASAP.   I do recommend that the dov

## 2020-09-15 ENCOUNTER — TELEPHONE (OUTPATIENT)
Dept: SURGERY | Facility: CLINIC | Age: 35
End: 2020-09-15

## 2020-09-15 ENCOUNTER — OFFICE VISIT (OUTPATIENT)
Dept: SURGERY | Facility: CLINIC | Age: 35
End: 2020-09-15
Payer: MEDICAID

## 2020-09-15 ENCOUNTER — PATIENT MESSAGE (OUTPATIENT)
Dept: FAMILY MEDICINE CLINIC | Facility: CLINIC | Age: 35
End: 2020-09-15

## 2020-09-15 VITALS
WEIGHT: 195 LBS | BODY MASS INDEX: 33.29 KG/M2 | HEART RATE: 92 BPM | SYSTOLIC BLOOD PRESSURE: 110 MMHG | HEIGHT: 64 IN | DIASTOLIC BLOOD PRESSURE: 78 MMHG

## 2020-09-15 DIAGNOSIS — N20.0 KIDNEY STONE: Primary | ICD-10-CM

## 2020-09-15 PROCEDURE — 3074F SYST BP LT 130 MM HG: CPT | Performed by: NURSE PRACTITIONER

## 2020-09-15 PROCEDURE — 99213 OFFICE O/P EST LOW 20 MIN: CPT | Performed by: NURSE PRACTITIONER

## 2020-09-15 PROCEDURE — 3008F BODY MASS INDEX DOCD: CPT | Performed by: NURSE PRACTITIONER

## 2020-09-15 PROCEDURE — 3078F DIAST BP <80 MM HG: CPT | Performed by: NURSE PRACTITIONER

## 2020-09-15 NOTE — TELEPHONE ENCOUNTER
Pt saw Han HURD today, I had pt sign ANTHONY, I then called [employer]:[phone] and received secure fax number f: [phone] I faxed over ANTHONY so we can receive ER records, fax confirmed it was sent, await for recor

## 2020-09-15 NOTE — PROGRESS NOTES
HPI:    Patient ID: Wilda Nieto is a 37year old female. HPI     Patient is a 37year old female who presents to the clinic for a consult regarding kidney stones. Past medical history of asthma, endometriosis, and GERD.       Patient states about 1 mo tamsulosin (FLOMAX) cap Take 0.4 mg by mouth daily. • ondansetron 4 MG Oral Tablet Dispersible      • HYDROcodone-acetaminophen (NORCO)  MG Oral Tab Take 1 tablet by mouth every 6 (six) hours as needed for Pain.  40 tablet 0   • Ondansetron HCl (Z No       PHYSICAL EXAM:    Physical Exam   Constitutional: She is oriented to person, place, and time. No distress. HENT:   Head: Normocephalic. Eyes: Conjunctivae are normal.   Neck: Normal range of motion. Cardiovascular: Normal rate.    Pulmonary/C

## 2020-09-16 ENCOUNTER — TELEPHONE (OUTPATIENT)
Dept: FAMILY MEDICINE CLINIC | Facility: CLINIC | Age: 35
End: 2020-09-16

## 2020-09-16 DIAGNOSIS — R10.9 ACUTE RIGHT FLANK PAIN: ICD-10-CM

## 2020-09-16 DIAGNOSIS — N20.1 RIGHT URETERAL STONE: ICD-10-CM

## 2020-09-16 DIAGNOSIS — F41.9 ANXIETY: ICD-10-CM

## 2020-09-16 RX ORDER — HYDROCODONE BITARTRATE AND ACETAMINOPHEN 10; 325 MG/1; MG/1
1 TABLET ORAL EVERY 4 HOURS PRN
Qty: 40 TABLET | Refills: 0 | Status: SHIPPED | OUTPATIENT
Start: 2020-09-16

## 2020-09-16 RX ORDER — ALPRAZOLAM 1 MG/1
1 TABLET ORAL NIGHTLY PRN
Qty: 30 TABLET | Refills: 0 | Status: SHIPPED | OUTPATIENT
Start: 2020-09-16 | End: 2020-11-25

## 2020-09-16 NOTE — TELEPHONE ENCOUNTER
Patient called back stating  pharmacy needs RN to call to verify frequency change in medication. Per patient medication was previously prescribed as every 6 hours, now changed to every 4 hours.    Patient also requesting  pharmacy list  on chart be update

## 2020-09-16 NOTE — TELEPHONE ENCOUNTER
Patient indicated that saw Dr Tiffanie Cook on 9/10/2020. Patient also saw the urologist yesterday for kidney stones and recommended patient go to the ER to be admitted, but patient indicated that she has no  and can not go till Friday 9/18/2020.  Judy

## 2020-09-16 NOTE — TELEPHONE ENCOUNTER
----- Message from Mayito Munson sent at 9/15/2020 10:38 PM CDT -----  Regarding: Other  Contact: [phone]  [facility] doctor Wiliam Quiroz I went to see the specialist today and they wanted to admit me but my son has to have surgery on his mouth Thursday so I can

## 2020-09-16 NOTE — TELEPHONE ENCOUNTER
I did refill the pain medication and the sedative.   Respectfully, I do not understand how a patient with stones and in that kind of pain who has seen a urologist who understands that account of pain can come away from that appointment without some pain cov

## 2020-09-16 NOTE — TELEPHONE ENCOUNTER
From: Mayito Munson  To:  Roxanne Lala DO  Sent: 9/15/2020 10:38 PM CDT  Subject: Other    Hey doctor Wiliam Quiroz I went to see the specialist today and they wanted to admit me but my son has to have surgery on his mouth Thursday so I can’t go til afte

## 2020-09-18 ENCOUNTER — APPOINTMENT (OUTPATIENT)
Dept: CT IMAGING | Facility: HOSPITAL | Age: 35
End: 2020-09-18
Attending: EMERGENCY MEDICINE
Payer: MEDICAID

## 2020-09-18 ENCOUNTER — HOSPITAL ENCOUNTER (EMERGENCY)
Facility: HOSPITAL | Age: 35
Discharge: HOME | End: 2020-09-18
Attending: EMERGENCY MEDICINE
Payer: MEDICAID

## 2020-09-18 VITALS
HEART RATE: 78 BPM | HEIGHT: 66 IN | SYSTOLIC BLOOD PRESSURE: 107 MMHG | OXYGEN SATURATION: 100 % | DIASTOLIC BLOOD PRESSURE: 52 MMHG | TEMPERATURE: 99 F | WEIGHT: 193 LBS | BODY MASS INDEX: 31.02 KG/M2 | RESPIRATION RATE: 17 BRPM

## 2020-09-18 DIAGNOSIS — R10.9 FLANK PAIN: Primary | ICD-10-CM

## 2020-09-18 LAB
ANION GAP SERPL CALC-SCNC: 2 MMOL/L (ref 0–18)
B-HCG UR QL: NEGATIVE
BASOPHILS # BLD AUTO: 0.06 X10(3) UL (ref 0–0.2)
BASOPHILS NFR BLD AUTO: 0.8 %
BILIRUB UR QL: NEGATIVE
BUN BLD-MCNC: 8 MG/DL (ref 7–18)
BUN/CREAT SERPL: 10.4 (ref 10–20)
CALCIUM BLD-MCNC: 9.9 MG/DL (ref 8.5–10.1)
CHLORIDE SERPL-SCNC: 107 MMOL/L (ref 98–112)
CLARITY UR: CLEAR
CO2 SERPL-SCNC: 29 MMOL/L (ref 21–32)
COLOR UR: YELLOW
CREAT BLD-MCNC: 0.77 MG/DL (ref 0.55–1.02)
DEPRECATED RDW RBC AUTO: 47.3 FL (ref 35.1–46.3)
EOSINOPHIL # BLD AUTO: 0.19 X10(3) UL (ref 0–0.7)
EOSINOPHIL NFR BLD AUTO: 2.6 %
ERYTHROCYTE [DISTWIDTH] IN BLOOD BY AUTOMATED COUNT: 12.7 % (ref 11–15)
GLUCOSE BLD-MCNC: 81 MG/DL (ref 70–99)
GLUCOSE UR-MCNC: NEGATIVE MG/DL
HCT VFR BLD AUTO: 41.2 % (ref 35–48)
HGB BLD-MCNC: 13.8 G/DL (ref 12–16)
HGB UR QL STRIP.AUTO: NEGATIVE
IMM GRANULOCYTES # BLD AUTO: 0.01 X10(3) UL (ref 0–1)
IMM GRANULOCYTES NFR BLD: 0.1 %
KETONES UR-MCNC: NEGATIVE MG/DL
LEUKOCYTE ESTERASE UR QL STRIP.AUTO: NEGATIVE
LYMPHOCYTES # BLD AUTO: 3.4 X10(3) UL (ref 1–4)
LYMPHOCYTES NFR BLD AUTO: 46.2 %
MCH RBC QN AUTO: 33.6 PG (ref 26–34)
MCHC RBC AUTO-ENTMCNC: 33.5 G/DL (ref 31–37)
MCV RBC AUTO: 100.2 FL (ref 80–100)
MONOCYTES # BLD AUTO: 0.67 X10(3) UL (ref 0.1–1)
MONOCYTES NFR BLD AUTO: 9.1 %
NEUTROPHILS # BLD AUTO: 3.03 X10 (3) UL (ref 1.5–7.7)
NEUTROPHILS # BLD AUTO: 3.03 X10(3) UL (ref 1.5–7.7)
NEUTROPHILS NFR BLD AUTO: 41.2 %
NITRITE UR QL STRIP.AUTO: NEGATIVE
OSMOLALITY SERPL CALC.SUM OF ELEC: 283 MOSM/KG (ref 275–295)
PH UR: 7 [PH] (ref 5–8)
PLATELET # BLD AUTO: 371 10(3)UL (ref 150–450)
POTASSIUM SERPL-SCNC: 4.1 MMOL/L (ref 3.5–5.1)
PROT UR-MCNC: NEGATIVE MG/DL
RBC # BLD AUTO: 4.11 X10(6)UL (ref 3.8–5.3)
SODIUM SERPL-SCNC: 138 MMOL/L (ref 136–145)
SP GR UR STRIP: 1.02 (ref 1–1.03)
UROBILINOGEN UR STRIP-ACNC: <2
WBC # BLD AUTO: 7.4 X10(3) UL (ref 4–11)

## 2020-09-18 PROCEDURE — 85025 COMPLETE CBC W/AUTO DIFF WBC: CPT | Performed by: EMERGENCY MEDICINE

## 2020-09-18 PROCEDURE — 80048 BASIC METABOLIC PNL TOTAL CA: CPT | Performed by: EMERGENCY MEDICINE

## 2020-09-18 PROCEDURE — 96361 HYDRATE IV INFUSION ADD-ON: CPT

## 2020-09-18 PROCEDURE — 74176 CT ABD & PELVIS W/O CONTRAST: CPT | Performed by: EMERGENCY MEDICINE

## 2020-09-18 PROCEDURE — 81025 URINE PREGNANCY TEST: CPT | Performed by: EMERGENCY MEDICINE

## 2020-09-18 PROCEDURE — 96374 THER/PROPH/DIAG INJ IV PUSH: CPT

## 2020-09-18 PROCEDURE — 81003 URINALYSIS AUTO W/O SCOPE: CPT | Performed by: EMERGENCY MEDICINE

## 2020-09-18 PROCEDURE — 99284 EMERGENCY DEPT VISIT MOD MDM: CPT

## 2020-09-18 RX ORDER — KETOROLAC TROMETHAMINE 30 MG/ML
30 INJECTION, SOLUTION INTRAMUSCULAR; INTRAVENOUS ONCE
Status: COMPLETED | OUTPATIENT
Start: 2020-09-18 | End: 2020-09-18

## 2020-09-18 RX ORDER — HYDROCODONE BITARTRATE AND ACETAMINOPHEN 5; 325 MG/1; MG/1
1 TABLET ORAL ONCE
Status: COMPLETED | OUTPATIENT
Start: 2020-09-18 | End: 2020-09-18

## 2020-09-19 ENCOUNTER — TELEPHONE (OUTPATIENT)
Dept: SURGERY | Facility: CLINIC | Age: 35
End: 2020-09-19

## 2020-09-19 NOTE — ED PROVIDER NOTES
Patient Seen in: [facility] Emergency Department      History   Patient presents with:  Abdomen/Flank Pain    Stated Complaint: kidney stone, back pain     HPI    72-year-old female with history of asthma, endometriosis, and ureteral stone present above.    Physical Exam     ED Triage Vitals [09/18/20 1909]   /88   Pulse 94   Resp 17   Temp 98.9 °F (37.2 °C)   Temp src Oral   SpO2 100 %   O2 Device None (Room air)       Current:/88   Pulse 94   Temp 98.9 °F (37.2 °C) (Oral)   Resp 17   H No obstructing ureteral stones. Will discharge the patient home with plans to follow-up with her primary physician.               Disposition and Plan     Clinical Impression:  Flank pain  (primary encounter diagnosis)    Disposition:  Discharge  9/18/2020

## 2020-09-19 NOTE — TELEPHONE ENCOUNTER
Per pt is wanting to know if Will SERGO has received results. Pt went to emergency room per her request. Pt is wanting to know what the next steps are. Pt made aware clinical staff will not be in until Monday.  Please advise

## 2020-09-19 NOTE — ED NOTES
Per Dr. Regine Dye 5mg for PT as PT is still having complaints of pain. This RN to enter verbal order.

## 2020-09-19 NOTE — ED NOTES
PT with complaint of pain 8/10 in her abdomen. MD aware. MD to wait for CT results to determine POC.

## 2020-09-21 NOTE — TELEPHONE ENCOUNTER
Per pt continues to have kidney stone pain, requesting to speak to RN, needs to know next step. Please call thank you.

## 2020-09-21 NOTE — TELEPHONE ENCOUNTER
Patient states she is still in pain bilateral flank area. States she did go to ER as instructed, sent home. Patient would like Darrel Block to look at CT stone protocol done in our ER on 9/18/20, and advise. Thank you.

## 2020-09-22 NOTE — TELEPHONE ENCOUNTER
I called and spoke with patient. She complains of  Bilateral back pain but also complains of epigastric pain and pain under her right rib.  + nausea, no vomiting. CT shows no acute abnormality. She does have bilateral renal calculi however no evidence of obstructing stones or hydronephrosis. UA negative. I discussed with her that I do no see any evidence that her pain is urological in origin. I did recommend follow up with her pcp. She does tell me her pain is worsened after eating. Dr. Charito Klein I recommended patient follow up with you for ongoing epigastric and back pain, worse after eating.  + nausea.

## 2020-09-28 NOTE — PROGRESS NOTES
9/10/2020-Presents ambulatory to [facility]; NEW CONSULT C/O LBP RADIATING DOWN RLE;   Also rt flak pain;   PT has hx of fall 2018 also kidney stones;

## 2020-10-12 ENCOUNTER — OFFICE VISIT (OUTPATIENT)
Dept: PAIN CLINIC | Facility: HOSPITAL | Age: 35
End: 2020-10-12
Attending: NURSE PRACTITIONER
Payer: MEDICAID

## 2020-10-12 DIAGNOSIS — G89.29 CHRONIC MIDLINE LOW BACK PAIN WITH RIGHT-SIDED SCIATICA: Primary | ICD-10-CM

## 2020-10-12 DIAGNOSIS — M54.41 CHRONIC MIDLINE LOW BACK PAIN WITH RIGHT-SIDED SCIATICA: Primary | ICD-10-CM

## 2020-10-12 PROCEDURE — 99211 OFF/OP EST MAY X REQ PHY/QHP: CPT

## 2020-10-12 NOTE — CHRONIC PAIN
Follow-up Note  CC: low back pain, right LE pain   HISTORY OF PRESENT ILLNESS:  Colletta Rast is a 37year old old female, originally referred to the pain clinic by Dr. Connelly ref. provider found, with history of No diagnosis found.  returns to the clinic for f Right SI joint injection   4/15/20 bilateral L3/4 L4/5 and L5/S1 facet injections   5/27/20 left L2/3, L3/4, L4/5, and L5/S1 RFAs  6/10/20 ILESI at L5/S1   PAIN COURSE AND PREVIOUS INTERVENTIONS:  Medications:  As above   Adjuvants:  As above     ALLERGIES palpitations   Respiratory:  No current shortness of breath   Gastrointestinal:  No active ulcer  Genitourinary:  Negative  Integumentary :  Negative  Psychiatric:  Negative  Hematologic: No active bleeding  Lymphatic: No current lymphedema  Allergic/Immun file        Minutes per session: Not on file      Stress: Not on file    Relationships      Social connections        Talks on phone: Not on file        Gets together: Not on file        Attends Yarsanism service: Not on file        Active member of club o MOTOR EXAMINATION:  LOWER EXTREMITY      LEFT RIGHT   Iliopsoas 5/5 5/5   Quadriceps 5/5 5/5   Foot DF 5/5 5/5   Foot EHL 5/5 5/5   Gastrocnemius 5/5 5/5     Able to toe and heel walk bilaterally   Temperature:  normal to touch bilateral upper and lowe

## 2020-10-12 NOTE — PROGRESS NOTES
Pt presents to [facility] for low back pain and right shoulder pain. Pt states having increased low back pain, pain is worst first thing in the morning. Pt states sitting makes pain better, but unable to sit for long period of time.  Pt is taking Norco 10/325mg pt

## 2020-10-19 ENCOUNTER — NURSE TRIAGE (OUTPATIENT)
Dept: FAMILY MEDICINE CLINIC | Facility: CLINIC | Age: 35
End: 2020-10-19

## 2020-10-19 RX ORDER — FLUCONAZOLE 150 MG/1
150 TABLET ORAL ONCE
Qty: 1 TABLET | Refills: 1 | Status: SHIPPED | OUTPATIENT
Start: 2020-10-19 | End: 2020-10-19

## 2020-10-19 NOTE — TELEPHONE ENCOUNTER
Pt was called and informed of Dr. Dan Render message below . Pt then asked for a different medication that she gets from [address] [place] [address] that she take 3 or 4 pills at 1 time. Pt was not sure of the name.  I was not able to find the medication pt was inquiring about

## 2020-10-19 NOTE — TELEPHONE ENCOUNTER
Action Requested: Summary for Provider     []  Critical Lab, Recommendations Needed  [] Need Additional Advice  []   FYI    []   Need Orders  [x] Need Medications Sent to Pharmacy  []  Other     SUMMARY: Per protocol advised office visit, but patient askin

## 2020-11-04 ENCOUNTER — TELEPHONE (OUTPATIENT)
Dept: FAMILY MEDICINE CLINIC | Facility: CLINIC | Age: 35
End: 2020-11-04

## 2020-11-04 ENCOUNTER — OFFICE VISIT (OUTPATIENT)
Dept: FAMILY MEDICINE CLINIC | Facility: CLINIC | Age: 35
End: 2020-11-04
Payer: MEDICAID

## 2020-11-04 VITALS
RESPIRATION RATE: 18 BRPM | HEART RATE: 83 BPM | HEIGHT: 66 IN | WEIGHT: 201.19 LBS | TEMPERATURE: 98 F | OXYGEN SATURATION: 96 % | DIASTOLIC BLOOD PRESSURE: 62 MMHG | SYSTOLIC BLOOD PRESSURE: 110 MMHG | BODY MASS INDEX: 32.33 KG/M2

## 2020-11-04 DIAGNOSIS — S39.92XS LOWER BACK INJURY, SEQUELA: Primary | ICD-10-CM

## 2020-11-04 PROCEDURE — 3074F SYST BP LT 130 MM HG: CPT | Performed by: FAMILY MEDICINE

## 2020-11-04 PROCEDURE — 99213 OFFICE O/P EST LOW 20 MIN: CPT | Performed by: FAMILY MEDICINE

## 2020-11-04 PROCEDURE — 3078F DIAST BP <80 MM HG: CPT | Performed by: FAMILY MEDICINE

## 2020-11-04 PROCEDURE — 3008F BODY MASS INDEX DOCD: CPT | Performed by: FAMILY MEDICINE

## 2020-11-04 RX ORDER — CARISOPRODOL 350 MG/1
350 TABLET ORAL 4 TIMES DAILY PRN
Qty: 360 TABLET | Refills: 0 | Status: CANCELLED | OUTPATIENT
Start: 2020-11-04

## 2020-11-04 RX ORDER — CARISOPRODOL 350 MG/1
TABLET ORAL
COMMUNITY
Start: 2020-09-25 | End: 2020-12-10

## 2020-11-04 RX ORDER — ERGOCALCIFEROL 1.25 MG/1
CAPSULE ORAL
COMMUNITY
Start: 2020-09-25

## 2020-11-04 NOTE — TELEPHONE ENCOUNTER
Patient is requesting a note to return to work. Patient states she wants to go back to work tomorrow 11/05/2020. Patient is requesting call back from nurse when note is ready for pickup at the [facility].

## 2020-11-04 NOTE — PROGRESS NOTES
HPI:    Sridhar Conklin is a 37year old female presents to clinic for follow-up regarding an injury. In January, patient slipped on ice and fell. Since then has had lower back pain.   Has seen Ortho and had multiple injections done, has also underwent ph MG Oral Cap 300 mg 3 (three) times daily. 0   • HYDROcodone-acetaminophen  MG Oral Tab Take 1 tablet by mouth every 4 (four) hours as needed. • ibuprofen 800 MG Oral Tab Take 800 mg by mouth 2 (two) times daily as needed.        • methocarbam This note was created by [employer] voice recognition. Errors in content may be related to improper recognition by the system; efforts to review and correct have been done but errors may still exist. Please contact me with any questions.        11/4/2020

## 2020-11-16 NOTE — ADDENDUM NOTE
Addended by: Madan Joshua on: 5/16/2017 04:25 PM     Modules accepted: Orders TERRY called wife and answered all questions.  Offered his condolences on the loss of her

## 2020-11-25 ENCOUNTER — NURSE TRIAGE (OUTPATIENT)
Dept: FAMILY MEDICINE CLINIC | Facility: CLINIC | Age: 35
End: 2020-11-25

## 2020-11-25 DIAGNOSIS — F41.9 ANXIETY: ICD-10-CM

## 2020-11-25 RX ORDER — ALPRAZOLAM 1 MG/1
1 TABLET ORAL NIGHTLY PRN
Qty: 7 TABLET | Refills: 0 | Status: SHIPPED
Start: 2020-11-25 | End: 2020-12-02

## 2020-11-25 NOTE — TELEPHONE ENCOUNTER
Action Requested: Summary for Provider     []  Critical Lab, Recommendations Needed  [] Need Additional Advice  []   FYI    []   Need Orders  [x] Need Medications Sent to Pharmacy  []  Other     SUMMARY: Per protocol disposition advised ER now but pt now s
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
## 2020-11-27 NOTE — TELEPHONE ENCOUNTER
I am sorry. I still do not understand what is required here. I reviewed the patient's chart in this particular medication. It looks like she was given or at least a prescription was sent and on November 25, 2020 for 7 tablets.  She takes the medicine once a

## 2020-11-27 NOTE — TELEPHONE ENCOUNTER
Verified pt name and . Pt states she is taking the Alprazolam twice not once a day as prescribed. Pt states she will discuss this with doctor at her child's appointment, doesn't need anything more at this time.

## 2020-12-02 ENCOUNTER — TELEPHONE (OUTPATIENT)
Dept: FAMILY MEDICINE CLINIC | Facility: CLINIC | Age: 35
End: 2020-12-02

## 2020-12-02 DIAGNOSIS — F41.9 ANXIETY: ICD-10-CM

## 2020-12-02 RX ORDER — ALPRAZOLAM 1 MG/1
1 TABLET ORAL NIGHTLY PRN
Qty: 30 TABLET | Refills: 0 | Status: SHIPPED | OUTPATIENT
Start: 2020-12-02

## 2020-12-02 NOTE — TELEPHONE ENCOUNTER
No.  This product causes dependency in a lot of cases. She will continue to get a 30-day supply to be taken in the evening or I can give the 0.5 mg tablet twice daily. She can make a decision which way she wants to go.

## 2020-12-02 NOTE — TELEPHONE ENCOUNTER
Pt requesting Alprazolam 1 mg refill and is asking if doctor can change sig to take twice a day rather than once nightly as needed. She would like prescription instructions changes as she also wakes up feeling anxious.     Med last filled with qty 7 tablets

## 2020-12-07 ENCOUNTER — TELEPHONE (OUTPATIENT)
Dept: FAMILY MEDICINE CLINIC | Facility: CLINIC | Age: 35
End: 2020-12-07

## 2020-12-07 NOTE — TELEPHONE ENCOUNTER
Prior authorization request received through CoverMyMeds    Prior authorization for Alprazolam completed w/ [employer] on cover my meds Key: [id number], turn around time 3-5 days.

## 2020-12-10 ENCOUNTER — TELEPHONE (OUTPATIENT)
Dept: FAMILY MEDICINE CLINIC | Facility: CLINIC | Age: 35
End: 2020-12-10

## 2020-12-10 RX ORDER — CARISOPRODOL 350 MG/1
TABLET ORAL
Qty: 60 TABLET | Refills: 0 | Status: SHIPPED | OUTPATIENT
Start: 2020-12-10 | End: 2020-12-10

## 2020-12-10 NOTE — TELEPHONE ENCOUNTER
DR. Carlos West: please review all medications. Patient at pharmacy. Pharmacy called earlier regarding drug to drug interactions. Please advise if ok to dispense.

## 2020-12-10 NOTE — TELEPHONE ENCOUNTER
Hunter Rosario from [address] jones  There is a drug interaction with medication       Carisoprodol prescribe today     she is taking     ALPRAZolam 1 MG Oral Tab      HYDROcodone-acetaminophen (NORCO)  MG Oral Tab      Please call pharmacy

## 2020-12-10 NOTE — TELEPHONE ENCOUNTER
Patient returned call    Advised of message below    She verbalized understanding and agreed to plan of care    Called pharmacy, spoke to pharmacist Bishop Person and she will discontinue prescription     Soma discontinued from patient's med list

## 2020-12-10 NOTE — TELEPHONE ENCOUNTER
Verified name and . Patient calling to follow up with messages seen below. Please advise and thank you.

## 2020-12-10 NOTE — TELEPHONE ENCOUNTER
Spoke with Rivera Rivera at Dr. Kevin Ag did relay information below to PCP--he was not aware she was getting Hydrocodone from another provider, so patient will need to contact pain doctor for Soma--he is cancelling today's Rx.

## 2021-03-18 ENCOUNTER — NURSE TRIAGE (OUTPATIENT)
Dept: FAMILY MEDICINE CLINIC | Facility: CLINIC | Age: 36
End: 2021-03-18

## 2021-03-18 DIAGNOSIS — R39.9 LOWER URINARY TRACT SYMPTOMS: Primary | ICD-10-CM

## 2021-03-18 NOTE — TELEPHONE ENCOUNTER
Action Requested: Summary for Provider     []  Critical Lab, Recommendations Needed  [x] Need Additional Advice  []   FYI    []   Need Orders  [] Need Medications Sent to Pharmacy  []  Other     SUMMARY: Patient requesting antibiotic for concerns of UTI an

## 2021-03-19 NOTE — TELEPHONE ENCOUNTER
At the very least this patient needs to go to any of our centers and give urine samples for urinalysis and culture. Once these results are available then we can treat the patient. Orders on the chart. Please call the patient and let her know.

## 2021-03-19 NOTE — TELEPHONE ENCOUNTER
Advised patient of Dr. Thurston Drought note and number given to scheduling. Patient verbalized understanding.

## 2021-03-23 ENCOUNTER — TELEPHONE (OUTPATIENT)
Dept: FAMILY MEDICINE CLINIC | Facility: CLINIC | Age: 36
End: 2021-03-23

## 2021-03-23 DIAGNOSIS — R39.9 URINARY TRACT INFECTION SYMPTOMS: Primary | ICD-10-CM

## 2021-03-23 NOTE — TELEPHONE ENCOUNTER
Patient went to Er and she have a Bladder infection they gave her Cefpodoxime and it is not cover with her insurance and she trying to get a substitute for it

## 2021-03-23 NOTE — TELEPHONE ENCOUNTER
Dr Estee Romero  Patient was treated at ER ([address]) for UTI and their prescription for Cefpodoxime   is not covered by her insurance. She is very uncomfortable and is requesting a new prescription rather than waiting for a PA.    Please reply to nico: ERON WALTON TR

## 2021-03-24 RX ORDER — NITROFURANTOIN 25; 75 MG/1; MG/1
100 CAPSULE ORAL 2 TIMES DAILY
Qty: 10 CAPSULE | Refills: 0 | Status: SHIPPED | OUTPATIENT
Start: 2021-03-24 | End: 2021-03-29

## 2021-03-24 NOTE — TELEPHONE ENCOUNTER
When was ER visit? Is there a culture result available to guide treatment? If not can send Rx for nitrofurantoin 100 mg twice daily x5 days to pharmacy. If history of fever or chills let me know as this would change treatment.

## 2021-03-24 NOTE — TELEPHONE ENCOUNTER
[3/24/2021 11:53 AM]    she said she went to [facility] on 3/22.  had urine testing which indicated UTi.  had back and side pain and hematuria but no fever. I can call [facility] and have try to have them fax the urine testing. .  Pt advised to start the Mac

## 2021-03-24 NOTE — TELEPHONE ENCOUNTER
I agree with Dr. Christiana Logan. I do not see anything on the chart that gives us any information regarding her reported urinary tract infection visit or treatment. I am placing on the chart in order for urinalysis and urine culture.   She can go to the [place]/

## 2021-03-24 NOTE — TELEPHONE ENCOUNTER
Pt asking again for an alternate antibiotic as she was seen and treated in the ER for UTI, but the antibiotic given was not covered by her insurance so she didn't get medicine yet.

## 2021-03-25 DIAGNOSIS — N39.0 E. COLI UTI (URINARY TRACT INFECTION): Primary | ICD-10-CM

## 2021-03-25 DIAGNOSIS — B96.20 E. COLI UTI (URINARY TRACT INFECTION): Primary | ICD-10-CM

## 2021-03-25 RX ORDER — CIPROFLOXACIN 250 MG/1
250 TABLET, FILM COATED ORAL 2 TIMES DAILY
Qty: 14 TABLET | Refills: 0 | Status: SHIPPED | OUTPATIENT
Start: 2021-03-25 | End: 2021-04-01

## 2021-06-15 ENCOUNTER — NURSE TRIAGE (OUTPATIENT)
Dept: FAMILY MEDICINE CLINIC | Facility: CLINIC | Age: 36
End: 2021-06-15

## 2021-06-15 NOTE — TELEPHONE ENCOUNTER
No double book. If you can find a slot very so-called reserved, then put her in. Again no double book.

## 2021-06-15 NOTE — TELEPHONE ENCOUNTER
Action Requested: Summary for Provider     []  Critical Lab, Recommendations Needed  [x] Need Additional Advice  []   FYI    []   Need Orders  [] Need Medications Sent to Pharmacy  []  Other     SUMMARY: Patient states for last month both of her knees are

## 2021-06-19 ENCOUNTER — OFFICE VISIT (OUTPATIENT)
Dept: FAMILY MEDICINE CLINIC | Facility: CLINIC | Age: 36
End: 2021-06-19
Payer: MEDICAID

## 2021-06-19 VITALS
WEIGHT: 201 LBS | RESPIRATION RATE: 18 BRPM | DIASTOLIC BLOOD PRESSURE: 73 MMHG | BODY MASS INDEX: 32.3 KG/M2 | HEIGHT: 66 IN | SYSTOLIC BLOOD PRESSURE: 113 MMHG | HEART RATE: 91 BPM

## 2021-06-19 DIAGNOSIS — M25.562 CHRONIC PAIN OF BOTH KNEES: Primary | ICD-10-CM

## 2021-06-19 DIAGNOSIS — H57.11 ACUTE PAIN IN RIGHT EYE: ICD-10-CM

## 2021-06-19 DIAGNOSIS — M25.572 CHRONIC PAIN OF BOTH ANKLES: ICD-10-CM

## 2021-06-19 DIAGNOSIS — N76.0 BV (BACTERIAL VAGINOSIS): ICD-10-CM

## 2021-06-19 DIAGNOSIS — G89.29 CHRONIC PAIN OF BOTH ANKLES: ICD-10-CM

## 2021-06-19 DIAGNOSIS — M25.561 CHRONIC PAIN OF BOTH KNEES: Primary | ICD-10-CM

## 2021-06-19 DIAGNOSIS — G62.9 SENSORY NEUROPATHY: ICD-10-CM

## 2021-06-19 DIAGNOSIS — M51.36 LUMBAR DEGENERATIVE DISC DISEASE: ICD-10-CM

## 2021-06-19 DIAGNOSIS — B96.89 BV (BACTERIAL VAGINOSIS): ICD-10-CM

## 2021-06-19 DIAGNOSIS — J02.9 PHARYNGITIS, UNSPECIFIED ETIOLOGY: ICD-10-CM

## 2021-06-19 DIAGNOSIS — B37.3 YEAST VAGINITIS: ICD-10-CM

## 2021-06-19 DIAGNOSIS — G89.29 CHRONIC PAIN OF BOTH KNEES: Primary | ICD-10-CM

## 2021-06-19 DIAGNOSIS — M25.571 CHRONIC PAIN OF BOTH ANKLES: ICD-10-CM

## 2021-06-19 PROCEDURE — 3008F BODY MASS INDEX DOCD: CPT | Performed by: FAMILY MEDICINE

## 2021-06-19 PROCEDURE — 99214 OFFICE O/P EST MOD 30 MIN: CPT | Performed by: FAMILY MEDICINE

## 2021-06-19 PROCEDURE — 3074F SYST BP LT 130 MM HG: CPT | Performed by: FAMILY MEDICINE

## 2021-06-19 PROCEDURE — 3078F DIAST BP <80 MM HG: CPT | Performed by: FAMILY MEDICINE

## 2021-06-19 RX ORDER — FLUCONAZOLE 150 MG/1
150 TABLET ORAL ONCE
Qty: 1 TABLET | Refills: 0 | Status: SHIPPED | OUTPATIENT
Start: 2021-06-19 | End: 2021-06-19

## 2021-06-19 RX ORDER — METRONIDAZOLE 500 MG/1
500 TABLET ORAL 2 TIMES DAILY
Qty: 14 TABLET | Refills: 0 | Status: SHIPPED | OUTPATIENT
Start: 2021-06-19

## 2021-06-19 RX ORDER — PENICILLIN V POTASSIUM 500 MG/1
500 TABLET ORAL 4 TIMES DAILY
Qty: 40 TABLET | Refills: 0 | Status: SHIPPED | OUTPATIENT
Start: 2021-06-19

## 2021-06-19 RX ORDER — PREDNISONE 10 MG/1
TABLET ORAL
Qty: 50 TABLET | Refills: 0 | Status: SHIPPED | OUTPATIENT
Start: 2021-06-19

## 2021-06-19 NOTE — PATIENT INSTRUCTIONS
Systemic inflammatory disorder work-up initiated. We must take a peek at and rule out rheumatoid process and/or lupus process. Patient may need to be seen again by physiatry regarding her low back/lumbar disease.   We will consider mild diuresis with pota

## 2021-06-21 ENCOUNTER — TELEPHONE (OUTPATIENT)
Dept: FAMILY MEDICINE CLINIC | Facility: CLINIC | Age: 36
End: 2021-06-21

## 2021-06-21 NOTE — TELEPHONE ENCOUNTER
The patient was informed of below. Test results now post immediately to your [employer] account. However, your provider may not have the chance to review or comment on them before the results reach you.     Our providers review and comment on all test result

## 2021-06-25 ENCOUNTER — TELEPHONE (OUTPATIENT)
Dept: FAMILY MEDICINE CLINIC | Facility: CLINIC | Age: 36
End: 2021-06-25

## 2021-06-26 NOTE — PROGRESS NOTES
HPI/Subjective:   Patient ID: Amina Olvera is a 31year old female.     This patient is a 21-year-old -American female who is well-established in our clinic who unfortunately is beginning to have symmetric joint pains in her knees and her ankles ridge (ZOFRAN) 4 mg tablet Take 1 tablet (4 mg total) by mouth every 8 (eight) hours as needed for Nausea. 20 tablet 2   • VENTOLIN  (90 Base) MCG/ACT Inhalation Aero Soln Inhale 2 puffs into the lungs every 6 (six) hours as needed for Wheezing.  1 Inhaler effort is normal.      Breath sounds: Normal breath sounds. Musculoskeletal:      Right knee: Swelling and bony tenderness present. Tenderness present. Left knee: Swelling and bony tenderness present. Tenderness present.       Right ankle: Swelling p to rule out any ophthalmic process including glaucoma and also we did a sed rate to rule out temporal arteritis. - OPHTHALMOLOGY - INTERNAL  - SED RATE, WESTERGREN (AUTOMATED);  Future  - SED RATE, WESTERGREN (AUTOMATED)    6. BV (bacterial vaginosis)  Pre disease. We will consider mild diuresis with potassium supplementation if tapering steroid medication does not work. Patient being referred to ophthalmology to rule out any general eye pathology but in particular glaucoma.   Systemic inflammatory disorder

## 2022-06-15 ENCOUNTER — TELEPHONE (OUTPATIENT)
Dept: FAMILY MEDICINE CLINIC | Facility: CLINIC | Age: 37
End: 2022-06-15

## 2022-06-15 ENCOUNTER — MED REC SCAN ONLY (OUTPATIENT)
Dept: FAMILY MEDICINE CLINIC | Facility: CLINIC | Age: 37
End: 2022-06-15

## 2022-06-15 NOTE — TELEPHONE ENCOUNTER
Per patient she had a pain in her right knee 06/09/2022 and she was not able to go to work from 06/09/2022 to 06/15/2022 and she is going back to work tomorrow 06/15/2022 without restriction. Please send the note to her Mychart so that she can give it to her work.

## 2022-06-16 NOTE — TELEPHONE ENCOUNTER
Called patient in regards to message below. Unfortunately we will no be able to issue patient a letter for her to be out of work for the past week. Patient has not been seen since 06/21.

## 2022-11-16 NOTE — TELEPHONE ENCOUNTER
Pt states that she just got off the phone with [facility] and would like to speak with Thierry Gar again. Please, call pt at [phone]. Birth Control Pills Counseling: Birth Control Pill Counseling: I discussed with the patient the potential side effects of OCPs including but not limited to increased risk of stroke, heart attack, thrombophlebitis, deep venous thrombosis, hepatic adenomas, breast changes, GI upset, headaches, and depression.  The patient verbalized understanding of the proper use and possible adverse effects of OCPs. All of the patient's questions and concerns were addressed.

## 2023-04-08 ENCOUNTER — APPOINTMENT (OUTPATIENT)
Dept: ULTRASOUND IMAGING | Facility: HOSPITAL | Age: 38
End: 2023-04-08
Attending: EMERGENCY MEDICINE
Payer: MEDICAID

## 2023-04-08 ENCOUNTER — APPOINTMENT (OUTPATIENT)
Dept: CT IMAGING | Facility: HOSPITAL | Age: 38
End: 2023-04-08
Attending: EMERGENCY MEDICINE
Payer: MEDICAID

## 2023-04-08 ENCOUNTER — TELEPHONE (OUTPATIENT)
Dept: FAMILY MEDICINE CLINIC | Facility: CLINIC | Age: 38
End: 2023-04-08

## 2023-04-08 ENCOUNTER — HOSPITAL ENCOUNTER (EMERGENCY)
Facility: HOSPITAL | Age: 38
Discharge: HOME | End: 2023-04-08
Attending: EMERGENCY MEDICINE
Payer: MEDICAID

## 2023-04-08 VITALS
SYSTOLIC BLOOD PRESSURE: 97 MMHG | RESPIRATION RATE: 17 BRPM | TEMPERATURE: 98 F | DIASTOLIC BLOOD PRESSURE: 60 MMHG | OXYGEN SATURATION: 94 % | BODY MASS INDEX: 35.68 KG/M2 | HEIGHT: 66 IN | WEIGHT: 222 LBS | HEART RATE: 78 BPM

## 2023-04-08 DIAGNOSIS — R07.89 CHEST PAIN, ATYPICAL: ICD-10-CM

## 2023-04-08 DIAGNOSIS — R60.9 PERIPHERAL EDEMA: Primary | ICD-10-CM

## 2023-04-08 LAB
ALBUMIN SERPL-MCNC: 3.5 G/DL (ref 3.4–5)
ALBUMIN/GLOB SERPL: 0.9 {RATIO} (ref 1–2)
ALP LIVER SERPL-CCNC: 83 U/L
ALT SERPL-CCNC: 31 U/L
ANION GAP SERPL CALC-SCNC: 6 MMOL/L (ref 0–18)
AST SERPL-CCNC: 23 U/L (ref 15–37)
B-HCG UR QL: NEGATIVE
BASOPHILS # BLD AUTO: 0.04 X10(3) UL (ref 0–0.2)
BASOPHILS NFR BLD AUTO: 0.5 %
BILIRUB SERPL-MCNC: 0.3 MG/DL (ref 0.1–2)
BILIRUB UR QL: NEGATIVE
BUN BLD-MCNC: 10 MG/DL (ref 7–18)
BUN/CREAT SERPL: 11.9 (ref 10–20)
CALCIUM BLD-MCNC: 9.3 MG/DL (ref 8.5–10.1)
CHLORIDE SERPL-SCNC: 107 MMOL/L (ref 98–112)
CLARITY UR: CLEAR
CO2 SERPL-SCNC: 29 MMOL/L (ref 21–32)
COLOR UR: YELLOW
CREAT BLD-MCNC: 0.84 MG/DL
DEPRECATED RDW RBC AUTO: 46.5 FL (ref 35.1–46.3)
EOSINOPHIL # BLD AUTO: 0.41 X10(3) UL (ref 0–0.7)
EOSINOPHIL NFR BLD AUTO: 5.1 %
ERYTHROCYTE [DISTWIDTH] IN BLOOD BY AUTOMATED COUNT: 13.1 % (ref 11–15)
GFR SERPLBLD BASED ON 1.73 SQ M-ARVRAT: 92 ML/MIN/1.73M2 (ref 60–?)
GLOBULIN PLAS-MCNC: 4 G/DL (ref 2.8–4.4)
GLUCOSE BLD-MCNC: 122 MG/DL (ref 70–99)
GLUCOSE UR-MCNC: NEGATIVE MG/DL
HCT VFR BLD AUTO: 38.3 %
HGB BLD-MCNC: 12.5 G/DL
IMM GRANULOCYTES # BLD AUTO: 0.01 X10(3) UL (ref 0–1)
IMM GRANULOCYTES NFR BLD: 0.1 %
KETONES UR-MCNC: NEGATIVE MG/DL
LEUKOCYTE ESTERASE UR QL STRIP.AUTO: NEGATIVE
LYMPHOCYTES # BLD AUTO: 3.99 X10(3) UL (ref 1–4)
LYMPHOCYTES NFR BLD AUTO: 49.7 %
MCH RBC QN AUTO: 31.3 PG (ref 26–34)
MCHC RBC AUTO-ENTMCNC: 32.6 G/DL (ref 31–37)
MCV RBC AUTO: 96 FL
MONOCYTES # BLD AUTO: 0.66 X10(3) UL (ref 0.1–1)
MONOCYTES NFR BLD AUTO: 8.2 %
NEUTROPHILS # BLD AUTO: 2.92 X10 (3) UL (ref 1.5–7.7)
NEUTROPHILS # BLD AUTO: 2.92 X10(3) UL (ref 1.5–7.7)
NEUTROPHILS NFR BLD AUTO: 36.4 %
NITRITE UR QL STRIP.AUTO: NEGATIVE
NT-PROBNP SERPL-MCNC: 52 PG/ML (ref ?–125)
OSMOLALITY SERPL CALC.SUM OF ELEC: 294 MOSM/KG (ref 275–295)
PH UR: 5.5 [PH] (ref 5–8)
PLATELET # BLD AUTO: 344 10(3)UL (ref 150–450)
POTASSIUM SERPL-SCNC: 4 MMOL/L (ref 3.5–5.1)
PROT SERPL-MCNC: 7.5 G/DL (ref 6.4–8.2)
PROT UR-MCNC: NEGATIVE MG/DL
RBC # BLD AUTO: 3.99 X10(6)UL
RBC #/AREA URNS AUTO: >10 /HPF
RBC #/AREA URNS AUTO: >10 /HPF
SODIUM SERPL-SCNC: 142 MMOL/L (ref 136–145)
SP GR UR STRIP: 1.02 (ref 1–1.03)
TROPONIN I HIGH SENSITIVITY: 4 NG/L
UROBILINOGEN UR STRIP-ACNC: 0.2
WBC # BLD AUTO: 8 X10(3) UL (ref 4–11)

## 2023-04-08 PROCEDURE — 93010 ELECTROCARDIOGRAM REPORT: CPT

## 2023-04-08 PROCEDURE — 84484 ASSAY OF TROPONIN QUANT: CPT | Performed by: EMERGENCY MEDICINE

## 2023-04-08 PROCEDURE — 83880 ASSAY OF NATRIURETIC PEPTIDE: CPT | Performed by: EMERGENCY MEDICINE

## 2023-04-08 PROCEDURE — 74177 CT ABD & PELVIS W/CONTRAST: CPT | Performed by: EMERGENCY MEDICINE

## 2023-04-08 PROCEDURE — 93005 ELECTROCARDIOGRAM TRACING: CPT

## 2023-04-08 PROCEDURE — 81025 URINE PREGNANCY TEST: CPT

## 2023-04-08 PROCEDURE — 81015 MICROSCOPIC EXAM OF URINE: CPT | Performed by: EMERGENCY MEDICINE

## 2023-04-08 PROCEDURE — 93970 EXTREMITY STUDY: CPT | Performed by: EMERGENCY MEDICINE

## 2023-04-08 PROCEDURE — 81001 URINALYSIS AUTO W/SCOPE: CPT | Performed by: EMERGENCY MEDICINE

## 2023-04-08 PROCEDURE — 85025 COMPLETE CBC W/AUTO DIFF WBC: CPT | Performed by: EMERGENCY MEDICINE

## 2023-04-08 PROCEDURE — 99285 EMERGENCY DEPT VISIT HI MDM: CPT

## 2023-04-08 PROCEDURE — 80053 COMPREHEN METABOLIC PANEL: CPT | Performed by: EMERGENCY MEDICINE

## 2023-04-08 PROCEDURE — 36415 COLL VENOUS BLD VENIPUNCTURE: CPT

## 2023-04-08 RX ORDER — TRAMADOL HYDROCHLORIDE 50 MG/1
50 TABLET ORAL ONCE
Status: COMPLETED | OUTPATIENT
Start: 2023-04-08 | End: 2023-04-08

## 2023-04-08 NOTE — TELEPHONE ENCOUNTER
Patient paged. Calling to say she was seen in the ER with lower extremity edema. Neg workup for DVT. Still experiencing lower extremity edema, pain in feet. Unable to walk, has to wear her  shoes. Was not prescribed anything. Denies chest pain, difficulty breathing. Wants to be seen today. Options given for immediate evaluation-ER, urgent care. Will call back on Monday to schedule follow-up visit with PCP, Dr. Selena Rolle. Responsible party/patient verbalized understanding of information discussed. No barriers to learning observed.

## 2023-04-09 LAB
ATRIAL RATE: 95 BPM
P AXIS: 62 DEGREES
P-R INTERVAL: 186 MS
Q-T INTERVAL: 386 MS
QRS DURATION: 76 MS
QTC CALCULATION (BEZET): 485 MS
R AXIS: 68 DEGREES
T AXIS: 37 DEGREES
VENTRICULAR RATE: 95 BPM

## 2023-04-09 NOTE — ED PROVIDER NOTES
Signed out by previous shift to follow-up results of DVT ultrasound and CT of the abdomen pelvis. Both studies are unremarkable. Discussed findings with patient and she is comfortable with discharge and follow-up with her doctor. [facility]  [phone] - [address]    NAME: Jacqueline Carl OF EXAM: 04/08/2023  Patient No:  [id number]  Physician:  Cezar Liriano  YOB: 1985    Past Medical History (entered by Technologist):    Reason For Exam (entered by Technologist):  r/o out mass lower ext edema  Other Notes (entered by Technologist): [phone]    Additional Information (per [facility] Radiologist):      CT ABDOMEN AND PELVIS WITH CONTRAST    Compared to 9/3/2020    IMPRESSION:  Mild pulmonary edema suspected. No calcified gallstones. No biliary ductal dilatation. No obstructive urinary calculi. No hydronephrosis. Normal appendix. No signs of bowel obstruction. Suspected uterine fibroids. No pelvic mass or lymphadenopathy. Report sent at 11:14 PM ET    Hannah Cordero MD  This report has been electronically signed and verified by the Radiologist whose name is printed above. DD:  04/08/2023/DT:  04/08/2023    [facility]  [phone] - Phone    [facility]    NAME: Jacqueline Carl OF EXAM: 04/08/2023  Patient No:  [id number]  Physician:  Cezar Liriano  YOB: 1985    Past Medical History (entered by Technologist):    Reason For Exam (entered by Technologist):  ble swelling  Other Notes (entered by Technologist): no evidence of dvt   bakers cyst on rt    Additional Information (per [facility] Radiologist):      BILATERAL LOWER EXTREMITY DUPLEX ULTRASOUND:  IMPRESSION    No evidence of deep venous thrombosis. Normal compression, augmentation, and blood flow. Small popliteal cyst noted on the right side measuring 1.5 x 0.8 x 1 cm    Case faxed/finalized at 12:05 AM eastern time . Yamini Rosario MD  This report has been electronically signed and verified by the Radiologist whose name is printed above.     DD:  04/08/2023/DT:  04/09/2023

## 2023-04-09 NOTE — ED INITIAL ASSESSMENT (HPI)
Pt seen at [facility] last night for chest pain, leg swelling and SOB and had CT chest, US b/l LE and legs. PT reports swelling and pain to both legs for the past several months.

## 2023-04-09 NOTE — DISCHARGE INSTRUCTIONS
Elevate legs  Eliminate added salt in diet and canned foods  Wear compression stockings  Follow-up with your doctor

## 2023-04-10 ENCOUNTER — TELEPHONE (OUTPATIENT)
Dept: FAMILY MEDICINE CLINIC | Facility: CLINIC | Age: 38
End: 2023-04-10

## 2023-04-10 NOTE — TELEPHONE ENCOUNTER
Pt was seen in ER for leg swelling on Saturday - stated this morning woke up this morning pain front of neck, large raised lump - size of 50 cent coin, tender to touch, head hurts really bad pain in front of head/back, right eye pain, no vision loss but feels dizzy, no SOB   Advised need to be evaluated in ER, Pt agreed

## 2023-04-13 NOTE — TELEPHONE ENCOUNTER
Pt called back-went to ER- at [facility], stated Thyroid infection, prescribed Abx, was advised to f/u with DR- Irregular heart rate , edema lower extremities , appt made for Monday

## 2023-04-17 PROBLEM — R10.2 PELVIC PAIN: Status: RESOLVED | Noted: 2017-06-09 | Resolved: 2023-04-17

## 2023-04-17 PROBLEM — S63.602A SPRAIN OF HAND, THUMB, LEFT: Status: RESOLVED | Noted: 2017-02-17 | Resolved: 2023-04-17

## 2023-04-17 PROBLEM — R10.2 CHRONIC PELVIC PAIN IN FEMALE: Status: ACTIVE | Noted: 2023-04-17

## 2023-04-17 PROBLEM — G89.29 CHRONIC PELVIC PAIN IN FEMALE: Status: ACTIVE | Noted: 2023-04-17

## 2023-04-17 PROBLEM — M79.671 RIGHT FOOT PAIN: Status: RESOLVED | Noted: 2018-07-19 | Resolved: 2023-04-17

## 2023-05-23 ENCOUNTER — TELEPHONE (OUTPATIENT)
Dept: FAMILY MEDICINE CLINIC | Facility: CLINIC | Age: 38
End: 2023-05-23

## 2023-05-23 NOTE — TELEPHONE ENCOUNTER
Pt states she spoke with a nurse today regarding her appointment. Pt states she was put on hold for several minutes and call dropped. Pt initially had appointment with Dr. Little Bush today, however, Dr. Little Bush became unavailable. Pt was rescheduled for June. Pt needs to be seen sooner. Pt was coming in for discoloration of foot. Pt states she was already prescribed antibiotics, had imagine done when in the ER. Pt state her 'body seems swollen.'  Offered appointments with other providers. Pt declined, only wants to see Dr. Little Bush. [facility] staff, did someone call Pt regarding moving her appointment sooner? No notes in chart.

## 2023-05-26 NOTE — TELEPHONE ENCOUNTER
Dr. Torrey Sharif,   patient has appointment 6/6/23 at 11:40am, would like to be seen before that day.

## 2023-05-27 NOTE — TELEPHONE ENCOUNTER
The patient can be seen June 2, 2023 at 11:40 AM as a double book, bilateral that she will not be seen until around 12:20 PM on the same day.   Thank you

## 2023-05-30 NOTE — TELEPHONE ENCOUNTER
Called patient, confirmed name and . Patient scheduled for  at 11:40 and knows appointment will be closer to 12:20.

## 2023-06-02 ENCOUNTER — OFFICE VISIT (OUTPATIENT)
Dept: FAMILY MEDICINE CLINIC | Facility: CLINIC | Age: 38
End: 2023-06-02

## 2023-06-02 VITALS
BODY MASS INDEX: 34.55 KG/M2 | SYSTOLIC BLOOD PRESSURE: 114 MMHG | DIASTOLIC BLOOD PRESSURE: 77 MMHG | WEIGHT: 215 LBS | TEMPERATURE: 98 F | HEART RATE: 73 BPM | HEIGHT: 66 IN

## 2023-06-02 DIAGNOSIS — L72.3 SEBACEOUS CYST: ICD-10-CM

## 2023-06-02 DIAGNOSIS — B96.89 BV (BACTERIAL VAGINOSIS): ICD-10-CM

## 2023-06-02 DIAGNOSIS — L70.0 CLOSED COMEDONE: ICD-10-CM

## 2023-06-02 DIAGNOSIS — B37.31 YEAST VAGINITIS: ICD-10-CM

## 2023-06-02 DIAGNOSIS — R60.0 FLUID RETENTION IN LEGS: Primary | ICD-10-CM

## 2023-06-02 DIAGNOSIS — N76.0 BV (BACTERIAL VAGINOSIS): ICD-10-CM

## 2023-06-02 DIAGNOSIS — R39.9 UTI SYMPTOMS: ICD-10-CM

## 2023-06-02 PROCEDURE — 3078F DIAST BP <80 MM HG: CPT | Performed by: FAMILY MEDICINE

## 2023-06-02 PROCEDURE — 3074F SYST BP LT 130 MM HG: CPT | Performed by: FAMILY MEDICINE

## 2023-06-02 PROCEDURE — 99214 OFFICE O/P EST MOD 30 MIN: CPT | Performed by: FAMILY MEDICINE

## 2023-06-02 PROCEDURE — 3008F BODY MASS INDEX DOCD: CPT | Performed by: FAMILY MEDICINE

## 2023-06-02 RX ORDER — CIPROFLOXACIN 250 MG/1
250 TABLET, FILM COATED ORAL 2 TIMES DAILY
Qty: 20 TABLET | Refills: 0 | Status: SHIPPED | OUTPATIENT
Start: 2023-06-02 | End: 2023-06-12

## 2023-06-02 RX ORDER — POTASSIUM CHLORIDE 750 MG/1
10 TABLET, FILM COATED, EXTENDED RELEASE ORAL DAILY
Qty: 30 TABLET | Refills: 0 | Status: SHIPPED | OUTPATIENT
Start: 2023-06-02

## 2023-06-02 RX ORDER — FUROSEMIDE 40 MG/1
40 TABLET ORAL DAILY
Qty: 30 TABLET | Refills: 0 | Status: SHIPPED | OUTPATIENT
Start: 2023-06-02

## 2023-06-02 RX ORDER — METRONIDAZOLE 500 MG/1
500 TABLET ORAL 2 TIMES DAILY
Qty: 14 TABLET | Refills: 0 | Status: SHIPPED | OUTPATIENT
Start: 2023-06-02 | End: 2023-06-09

## 2023-06-02 RX ORDER — FLUCONAZOLE 150 MG/1
150 TABLET ORAL ONCE
Qty: 1 TABLET | Refills: 0 | Status: SHIPPED | OUTPATIENT
Start: 2023-06-02 | End: 2023-06-02

## 2023-06-02 NOTE — PATIENT INSTRUCTIONS
Lasix 40 mg orally daily and it has been recommended to be taken before 10:00 in the morning along with potassium in order to keep the patient from cramping. Flagyl 500 mg twice a day for 7 days for BV. Patient instructed to complete this before she should start taking Cipro to 50 mg orally twice a day. Patient might benefit from RICHY hose. Patient has been advised that when she is sitting around at home she should keep her legs up and not let them hang dependent as this will perhaps have the fluid retention in lower extremities persist.  Patient being referred to general surgery for black comedone sebaceous cyst in right scapular region.

## 2023-07-25 ENCOUNTER — TELEPHONE (OUTPATIENT)
Dept: FAMILY MEDICINE CLINIC | Facility: CLINIC | Age: 38
End: 2023-07-25

## 2023-07-25 DIAGNOSIS — N20.0 RENAL STONES: Primary | ICD-10-CM

## 2023-07-25 NOTE — TELEPHONE ENCOUNTER
Patient called that she just found out that she has kidney stones and needs referral for urologist, Declined other providers at the office but is filling to do a video or phone visit to talk about some issues and get referral

## 2023-07-26 NOTE — TELEPHONE ENCOUNTER
Called patient, confirmed name and . Patient advised that urology referral placed, provided phone number. Patient advised to make appointment with Dr. Sandy Gimenez, patient states it is not urgent and she will call for first available.

## 2023-07-26 NOTE — TELEPHONE ENCOUNTER
Referral to neurology is on the chart. Please call the patient to let her know. I am not available for any additional appointments until August 7, 2023.

## 2023-12-11 ENCOUNTER — MED REC SCAN ONLY (OUTPATIENT)
Dept: FAMILY MEDICINE CLINIC | Facility: CLINIC | Age: 38
End: 2023-12-11

## 2024-01-11 ENCOUNTER — MED REC SCAN ONLY (OUTPATIENT)
Dept: FAMILY MEDICINE CLINIC | Facility: CLINIC | Age: 39
End: 2024-01-11

## 2025-03-10 NOTE — TELEPHONE ENCOUNTER
Spoke with the patient,verified full name and , informed her of message and assisted with appointment.     Future Appointments   Date Time Provider Whit Zee   2021 12:00 PM DO ROGER Anderson       Patient informed n/a

## 2025-03-29 DIAGNOSIS — F41.9 ANXIETY: ICD-10-CM
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
## 2025-03-29 NOTE — TELEPHONE ENCOUNTER
Alprazolam is not a pain medication.  I will not refill it.  If there is any 24 reservation appointments available, you can place the patient in 1 of those slots.

## 2025-03-31 RX ORDER — ALPRAZOLAM 1 MG/1
1 TABLET ORAL 2 TIMES DAILY PRN
Qty: 60 TABLET | Refills: 0 | OUTPATIENT
Start: 2025-03-31

## 2025-03-31 NOTE — TELEPHONE ENCOUNTER
Patient called asking for update with sooner appointment with Dr Katz. Patient informed that her prior message was routed to Dr Katz's office and we are awaiting a reply. As soon as Dr sanfordies we will reach back out to her.    Patient voiced understanding

## 2025-03-31 NOTE — TELEPHONE ENCOUNTER
I spoke to patient;  I offered first available Res 24 slot April 25th with Dr Shubham Kazt  She said she is not waiting that long; Unless he refills her medication until then.  She knows Dr Katz can fit her in sooner.   I told patient that's all I could offer.    Also, patient said this will be a Workers Comp visit.  Please reply to pool: EM CC IM FM ALG RHE [[phone]]

## 2025-03-31 NOTE — TELEPHONE ENCOUNTER
Kickplay message sent .    =please assist         LAST VISIT 6/2/2023.     No future appointments.

## 2025-04-01 NOTE — TELEPHONE ENCOUNTER
Patient calling to follow up on appointment request below, asking if Dr. Katz can call patient himself.

## 2025-04-03 NOTE — TELEPHONE ENCOUNTER
Patient scheduled for visit for below date and time:  Soonest Res 24 visit available  Future Appointments   Date Time Provider Department Center   4/25/2025  1:20 PM Shubham Katz, DO [facility]     Patient asking if sooner visit would be accommodated by provider, she does not feels she can wait this long but booked because soonest available.

## 2025-04-04 NOTE — TELEPHONE ENCOUNTER
Please put this patient on my schedule as a double book at 1:40 PM on Thursday, April 10, 2025.  Thank you.

## 2025-04-10 ENCOUNTER — OFFICE VISIT (OUTPATIENT)
Dept: FAMILY MEDICINE CLINIC | Facility: CLINIC | Age: 40
End: 2025-04-10

## 2025-04-10 VITALS
SYSTOLIC BLOOD PRESSURE: 118 MMHG | RESPIRATION RATE: 18 BRPM | HEART RATE: 82 BPM | TEMPERATURE: 98 F | OXYGEN SATURATION: 94 % | WEIGHT: 219 LBS | BODY MASS INDEX: 35 KG/M2 | DIASTOLIC BLOOD PRESSURE: 75 MMHG

## 2025-04-10 DIAGNOSIS — S93.601D SPRAIN OF RIGHT FOOT, SUBSEQUENT ENCOUNTER: Primary | ICD-10-CM

## 2025-04-10 DIAGNOSIS — S93.431D SPRAIN OF TIBIOFIBULAR LIGAMENT OF RIGHT ANKLE, SUBSEQUENT ENCOUNTER: ICD-10-CM

## 2025-04-10 DIAGNOSIS — Z28.21 PNEUMOCOCCAL VACCINATION DECLINED BY PATIENT: ICD-10-CM

## 2025-04-10 DIAGNOSIS — F41.9 ANXIETY: ICD-10-CM

## 2025-04-10 DIAGNOSIS — Z28.21 TETANUS, DIPHTHERIA, AND ACELLULAR PERTUSSIS (TDAP) VACCINATION DECLINED: ICD-10-CM

## 2025-04-10 DIAGNOSIS — E66.811 CLASS 1 OBESITY WITHOUT SERIOUS COMORBIDITY WITH BODY MASS INDEX (BMI) OF 34.0 TO 34.9 IN ADULT, UNSPECIFIED OBESITY TYPE: ICD-10-CM

## 2025-04-10 PROCEDURE — 99214 OFFICE O/P EST MOD 30 MIN: CPT | Performed by: FAMILY MEDICINE

## 2025-04-10 RX ORDER — SULFAMETHOXAZOLE AND TRIMETHOPRIM 800; 160 MG/1; MG/1
TABLET ORAL
COMMUNITY
Start: 2025-03-25

## 2025-04-10 RX ORDER — HYDROXYZINE HYDROCHLORIDE 25 MG/1
25 TABLET, FILM COATED ORAL
COMMUNITY
Start: 2025-03-03

## 2025-04-10 RX ORDER — NAPROXEN 500 MG/1
500 TABLET ORAL
COMMUNITY
Start: 2025-03-25 | End: 2025-04-14

## 2025-04-10 RX ORDER — MULTIVITAMIN
1 TABLET ORAL DAILY
COMMUNITY
End: 2025-04-14

## 2025-04-10 RX ORDER — ALPRAZOLAM 1 MG/1
1 TABLET ORAL 2 TIMES DAILY PRN
Qty: 30 TABLET | Refills: 0 | Status: SHIPPED | OUTPATIENT
Start: 2025-04-10 | End: 2025-06-09

## 2025-04-10 RX ORDER — ALPRAZOLAM 1 MG/1
1 TABLET ORAL NIGHTLY PRN
Qty: 30 TABLET | Refills: 0 | Status: SHIPPED | OUTPATIENT
Start: 2025-04-10 | End: 2025-04-10

## 2025-04-10 NOTE — PATIENT INSTRUCTIONS
Pain management.  Patient has been encouraged to continue to wear support boot.  Antianxiety medication refilled.  Patient encouraged to continue with podiatry follow-ups and to follow all recommendations given by the specialist.

## 2025-04-10 NOTE — PROGRESS NOTES
Subjective:     Patient ID: Francois Mccray is a 40 year old female.    This patient is well-established 40-year-old -American female who is doing a follow-up after sustaining an injury to her right ankle/foot on March 3, 2025.  The patient was loading particles in the freezer and picked up a box with items shifted and began to fall over and the patient appropriately stepped back to avoid potential danger and unfortunately stepped back into a defect in the concrete.    Patient severely sprained her right ankle.  The initial emergency room assessment including the films taken of the foot and ankle reveals soft tissue swelling without any indication for fracture or dislocation.    The patient did have an MRI of the injured region Tuesday, April 8, 2025.  Patient is here to follow-up on MRI and also general follow-up regarding her injury.    The patient is appropriately ambulating in a boot.    Pending the patient's progress and MRI report, she may need specialty assessment via podiatry and/or orthopedics and the patient may ultimately need PT/OT for strengthening the right foot and ankle.      History/Other:   Review of Systems  Current Medications[1]  Allergies:Allergies[2]    Past Medical History[3]   Past Surgical History[4]   Family History[5]   Social History: Short Social Hx on File[6]     Objective:   Vitals:    04/10/25 1327   BP: 118/75   Pulse: 82   Resp: 18   Temp: 98 °F (36.7 °C)       Physical Exam  Constitutional:       Appearance: Normal appearance. She is not ill-appearing.   Cardiovascular:      Rate and Rhythm: Normal rate and regular rhythm.      Heart sounds:      No gallop.   Pulmonary:      Effort: Pulmonary effort is normal.      Breath sounds: Normal breath sounds.   Musculoskeletal:      Comments: Patient is in an orthotic boot on the right lower extremity/foot.   Neurological:      Mental Status: She is alert and oriented to person, place, and time.         Assessment & Plan:   1. Sprain  of right foot, subsequent encounter  Patient under the care of of podiatry following the progression of the patient's healing process.    2. Sprain of tibiofibular ligament of right ankle, subsequent encounter  Same as #1.    3. Anxiety  The following sedative has been prescribed and the new Sigg is twice daily as needed with a quantity of 60.  A second prescription has been sent to the pharmacy to negate the first 1.  - Naloxone HCl 4 MG/0.1ML Nasal Liquid; 4 mg by Intranasal route as needed (May repeat as needed in other nostril if symptoms persist). If patient remains unresponsive, repeat dose in other nostril 2-5 minutes after first dose.  Dispense: 1 kit; Refill: 0  - ALPRAZolam 1 MG Oral Tab; Take 1 tablet (1 mg total) by mouth 2 (two) times daily as needed.  Dispense: 30 tablet; Refill: 0    4. Class 1 obesity without serious comorbidity with body mass index (BMI) of 34.0 to 34.9 in adult, unspecified obesity type  Recommend weight loss via daily exercising and consistent healthy dietary changes.    5. Tetanus, diphtheria, and acellular pertussis (Tdap) vaccination declined  Declined by patient.  - TETANUS, DIPHTHERIA TOXOIDS AND ACELLULAR PERTUSIS VACCINE (TDAP), >7 YEARS, IM USE    6. Pneumococcal vaccination declined by patient  Declined by patient.  - Prevnar 20 (PCV20) [03304]      Orders Placed This Encounter   Procedures    TETANUS, DIPHTHERIA TOXOIDS AND ACELLULAR PERTUSIS VACCINE (TDAP), >7 YEARS, IM USE    Prevnar 20 (PCV20) [36928]       Meds This Visit:  Requested Prescriptions     Signed Prescriptions Disp Refills    ALPRAZolam 1 MG Oral Tab 30 tablet 0     Sig: Take 1 tablet (1 mg total) by mouth nightly as needed.    Naloxone HCl 4 MG/0.1ML Nasal Liquid 1 kit 0     Si mg by Intranasal route as needed (May repeat as needed in other nostril if symptoms persist). If patient remains unresponsive, repeat dose in other nostril 2-5 minutes after first dose.       Imaging & Referrals:  TETANUS,  DIPHTHERIA TOXOIDS AND ACELLULAR PERTUSIS VACCINE (TDAP), >7 YEARS, IM USE  PCV20 VACCINE FOR INTRAMUSCULAR USE     Patient Instructions   Pain management.  Patient has been encouraged to continue to wear support boot.  Antianxiety medication refilled.  Patient encouraged to continue with podiatry follow-ups and to follow all recommendations given by the specialist.    Return in about 3 months (around 7/10/2025), or if symptoms worsen or fail to improve.         [1]   Current Outpatient Medications   Medication Sig Dispense Refill    hydrOXYzine 25 MG Oral Tab Take 1 tablet (25 mg total) by mouth.      ALPRAZolam 1 MG Oral Tab Take 1 tablet (1 mg total) by mouth nightly as needed. 30 tablet 0    Naloxone HCl 4 MG/0.1ML Nasal Liquid 4 mg by Intranasal route as needed (May repeat as needed in other nostril if symptoms persist). If patient remains unresponsive, repeat dose in other nostril 2-5 minutes after first dose. 1 kit 0    Multiple Vitamin (ONE-DAILY MULTI VITAMINS) Oral Tab Take 1 tablet by mouth daily. (Patient not taking: Reported on 4/10/2025)      naproxen 500 MG Oral Tab Take 1 tablet (500 mg total) by mouth. (Patient not taking: Reported on 4/10/2025)      sulfamethoxazole-trimethoprim -160 MG Oral Tab per tablet TAKE 1 TABLET BY MOUTH EVERY MORNING AND AND EVERY NIGHT AT BEDTIME FOR 3 DAYS (Patient not taking: Reported on 4/10/2025)      furosemide 40 MG Oral Tab Take 1 tablet (40 mg total) by mouth daily. (Patient not taking: Reported on 4/10/2025) 30 tablet 0    Potassium Chloride ER 10 MEQ Oral Tab CR Take 1 tablet (10 mEq total) by mouth daily. (Patient not taking: Reported on 4/10/2025) 30 tablet 0    ALPRAZolam 0.5 MG Oral Tab Take 1 tablet (0.5 mg total) by mouth nightly. (Patient not taking: Reported on 4/10/2025)      Diclofenac Sodium (VOLTAREN) 1 % External Gel Voltaren 1 % topical gel   APPLY 2 GRAMS TO THE AFFECTED AREA(S) BY TOPICAL ROUTE 4 TIMES PER DAY (Patient not taking: Reported  on 4/10/2025)      predniSONE 10 MG Oral Tab Take 4 tablets orally daily for 1 week, then 2 tablets daily for the 2nd week, and then 1 tablet daily for the 3rd week and stop (Patient not taking: Reported on 4/10/2025) 50 tablet 0    penicillin v potassium 500 MG Oral Tab Take 1 tablet (500 mg total) by mouth 4 (four) times daily. (Patient not taking: Reported on 4/10/2025) 40 tablet 0    metRONIDAZOLE (FLAGYL) 500 MG Oral Tab Take 1 tablet (500 mg total) by mouth 2 (two) times a day. (Patient not taking: Reported on 4/10/2025) 14 tablet 0    ergocalciferol 1.25 MG (10534 UT) Oral Cap TK 1 C PO ONCE WEEKLY (Patient not taking: Reported on 4/10/2025)      HYDROcodone-acetaminophen (NORCO)  MG Oral Tab Take 1 tablet by mouth every 4 (four) hours as needed for Pain. (Patient not taking: Reported on 4/10/2025) 40 tablet 0    Ondansetron HCl (ZOFRAN) 4 mg tablet Take 1 tablet (4 mg total) by mouth every 8 (eight) hours as needed for Nausea. (Patient not taking: Reported on 4/10/2025) 20 tablet 2    VENTOLIN  (90 Base) MCG/ACT Inhalation Aero Soln Inhale 2 puffs into the lungs every 6 (six) hours as needed for Wheezing. (Patient not taking: Reported on 4/10/2025) 1 Inhaler 3    Fluticasone Propionate 50 MCG/ACT Nasal Suspension fluticasone propionate 50 mcg/actuation nasal spray,suspension (Patient not taking: Reported on 4/10/2025)      ibuprofen 800 MG Oral Tab Take 1 tablet (800 mg total) by mouth 2 (two) times daily as needed. (Patient not taking: Reported on 4/10/2025)     [2]   Allergies  Allergen Reactions    Codeine NAUSEA ONLY, OTHER (SEE COMMENTS) and RASH    Cyclobenzaprine HIVES and OTHER (SEE COMMENTS)     rash  rash  Unknown  rash  rash  rash    Morphine OTHER (SEE COMMENTS)     Stomach pain      Singulair [Montelukast] OTHER (SEE COMMENTS)     Mood changes   [3]   Past Medical History:   Asthma (HCC)    Back problem    Endometriosis    Ganglion cyst of wrist    excision    Kidney stone   [4]    Past Surgical History:  Procedure Laterality Date          Lap ablat uterine fibroids      Musculoskeletal surgery unlisted      excise ganglion cyst wrist    Tubal ligation     [5]   Family History  Problem Relation Age of Onset    Hypertension Mother     Migraines Mother    [6]   Social History  Socioeconomic History    Marital status: Single   Tobacco Use    Smoking status: Former    Smokeless tobacco: Former   Vaping Use    Vaping status: Never Used   Substance and Sexual Activity    Alcohol use: No    Drug use: No   Other Topics Concern    Caffeine Concern Yes     Comment: soda     Social Drivers of Health     Food Insecurity: Food Insecurity Present (2023)    Received from [facility]    Food Insecurity     Within the past 12 months, you worried that your food would run out before you got the money to buy more.: 2     Within the past 12 months, the food you bought just didn't last and you didn't have money to get more.: 2   Transportation Needs: No Transportation Needs (2023)    Received from [facility]    Transportation Needs     In the past 12 months, has lack of transportation kept you from medical appointments or from getting medications?: 2     In the past 12 months, has lack of transportation kept you from meetings, work, or from getting things needed for daily living?: 2   Stress: Stress Concern Present (2023)    Received from [facility]    [employer] - Occupational Stress Questionnaire     Feeling of Stress : To some extent   Housing Stability: Unknown (2023)    Received from [facility]    Housing Stability     In the last 12 months, was there a time when you were not able to pay the mortgage or rent on time?: 2     In the last 12 months, was there a time when you did not have a steady place to sleep or slept in a shelter (including now)?: 2

## 2025-04-10 NOTE — TELEPHONE ENCOUNTER
Contacted patient (name and  of patient verified). She states she is heading to office soon for appointment, discussed this with Dr. Katz.  Double-book appointment added to schedule:  4/10/2025  1:40 PM Shubham Katz, DO [facility]

## 2025-04-11 ENCOUNTER — TELEPHONE (OUTPATIENT)
Dept: FAMILY MEDICINE CLINIC | Facility: CLINIC | Age: 40
End: 2025-04-11

## 2025-04-11 DIAGNOSIS — N20.1 RIGHT URETERAL STONE: ICD-10-CM

## 2025-04-11 DIAGNOSIS — R10.9 ACUTE RIGHT FLANK PAIN: ICD-10-CM

## 2025-04-11 RX ORDER — HYDROCODONE BITARTRATE AND ACETAMINOPHEN 10; 325 MG/1; MG/1
1 TABLET ORAL EVERY 4 HOURS PRN
Qty: 40 TABLET | Refills: 0 | Status: SHIPPED | OUTPATIENT
Start: 2025-04-11

## 2025-04-11 NOTE — TELEPHONE ENCOUNTER
Routing to pod mates Dr. Ozuna and Dr. Youssef (in office Saturday), please advise on pain medication for ankle sprain or other recommendations. Thank you.      Per chart review, narloxone prescribed yesterday at office visit, however no opiate pain medication noted:      Per office visit 4/10/25 progress note by Dr. Katz, pain management plan unclear (no prescribed pain medication, no instructions for over-the-counter pain medication, no referral to pain specialist/physiatry):  \"Patient Instructions   Pain management.  Patient has been encouraged to continue to wear support boot.  Antianxiety medication refilled.  Patient encouraged to continue with podiatry follow-ups and to follow all recommendations given by the specialist.  Return in about 3 months (around 7/10/2025), or if symptoms worsen or fail to improve.\"

## 2025-04-11 NOTE — TELEPHONE ENCOUNTER
Narcotic pain medication sent to the pharmacy.  Please call the patient and let her know.  Thank you.

## 2025-04-24 ENCOUNTER — TELEPHONE (OUTPATIENT)
Dept: FAMILY MEDICINE CLINIC | Facility: CLINIC | Age: 40
End: 2025-04-24

## 2025-04-24 DIAGNOSIS — F41.9 ANXIETY: ICD-10-CM

## 2025-04-28 NOTE — TELEPHONE ENCOUNTER
Please review.  Protocol Failed.    Alprazolam Recent fills : 3/25/25 #10 written by JONATHAN Smith, 4/10/25 #30 Dr. Katz  Last prescription written: 4/10/25  Last office visit:  4/10/2025 for acute    Most recent annual Physical Exam 5/2020    MOG message sent to patient to schedule an office visit with Primary Care Provider.   Will also route to Call Center to make a phone attempt.     Requested Prescriptions   Pending Prescriptions Disp Refills    ALPRAZolam 1 MG Oral Tab 30 tablet 0     Sig: Take 1 tablet (1 mg total) by mouth 2 (two) times daily as needed.       Controlled Substance Medication Failed - 4/28/2025  5:22 PM        Failed - This medication is a controlled substance - forward to provider to refill        Passed - Medication is active on med list

## 2025-04-28 NOTE — TELEPHONE ENCOUNTER
Please call patient to make an appointment.  Thank you   Annual Physical Exam overdue  MyChart Message sent to patient.

## 2025-04-29 RX ORDER — ALPRAZOLAM 1 MG/1
1 TABLET ORAL 2 TIMES DAILY PRN
Qty: 30 TABLET | Refills: 0 | Status: SHIPPED | OUTPATIENT
Start: 2025-04-29 | End: 2025-06-28

## 2025-05-13 ENCOUNTER — TELEPHONE (OUTPATIENT)
Dept: FAMILY MEDICINE CLINIC | Facility: CLINIC | Age: 40
End: 2025-05-13

## 2025-05-13 DIAGNOSIS — F41.9 ANXIETY: ICD-10-CM

## 2025-05-13 RX ORDER — ALPRAZOLAM 1 MG/1
1 TABLET ORAL 2 TIMES DAILY PRN
Qty: 30 TABLET | Refills: 0 | Status: SHIPPED | OUTPATIENT
Start: 2025-05-13 | End: 2025-07-12

## 2025-05-13 NOTE — TELEPHONE ENCOUNTER
Spoke to patient, full name and date of birth verified.  Patient is aware the alprazolam was sent in, patient very grateful for the call back.  No further needs at this time.

## 2025-05-13 NOTE — TELEPHONE ENCOUNTER
please see message below.  Patient is calling again if you can please sent her medication to the pharmacy as she has a ride right now and will like to pick it up. Thank you.

## 2025-05-13 NOTE — TELEPHONE ENCOUNTER
Patient calling to [facility] saw Dr. Katz this morning, and Dr. Katz was supposed to send refill for Xanax to [facility] in [place] on [address], but nothing was sent. Do not see appointment scheduled or completed for patient, but do see appointment completed for patient's daughter Barbara Chavez.

## 2025-05-14 ENCOUNTER — TELEPHONE (OUTPATIENT)
Dept: FAMILY MEDICINE CLINIC | Facility: CLINIC | Age: 40
End: 2025-05-14

## 2025-05-14 NOTE — TELEPHONE ENCOUNTER
Prit , pharmacist calling  from [facility] , Prit is  currently filling  Xanax RX and chart is being flagged for Opiates , Norco specifically     Prit states the following RX for  Norco  from Dr Hernandez  ( ortho ) :  10-325mg  #30  on 4/25, 5/1 and #42 on 5/8/2025      Prit requesting to send to PCP a FYI

## 2025-05-27 DIAGNOSIS — F41.9 ANXIETY: ICD-10-CM

## 2025-05-27 RX ORDER — ALPRAZOLAM 1 MG/1
1 TABLET ORAL 2 TIMES DAILY PRN
Qty: 30 TABLET | Refills: 0 | Status: SHIPPED | OUTPATIENT
Start: 2025-05-27 | End: 2025-07-26

## 2025-05-27 NOTE — TELEPHONE ENCOUNTER
Please review. Protocol Failed; No Protocol      Recent fills: 4/30/2025, 5/14/2025  Last Rx written: 5/13/2025  Last office visit: 4/10/2025

## 2025-05-27 NOTE — TELEPHONE ENCOUNTER
Patient called to request a refill for the following medication:     Medication Quantity Refills Start End   ALPRAZolam 1 MG Oral Tab 30 tablet 0 5/13/2025 7/12/2025     Pharmacy:   [id number] [place] [id number] AT [facility] [address] [facility] [address] [phone], [phone]     Patient is out of the medication and will be out of town tomorrow at 01:00 pm

## 2025-06-10 DIAGNOSIS — F41.9 ANXIETY: ICD-10-CM

## 2025-06-10 NOTE — TELEPHONE ENCOUNTER
Patient asking for a refill on:    ALPRAZolam 1 MG Oral Tab       [facility] #[id number] - [place], [place] - [address] AT [facility] OF [facility] [address] [phone], [phone]

## 2025-06-11 RX ORDER — ALPRAZOLAM 1 MG/1
1 TABLET ORAL 2 TIMES DAILY PRN
Qty: 30 TABLET | Refills: 0 | OUTPATIENT
Start: 2025-06-11 | End: 2025-08-10

## 2025-06-11 NOTE — TELEPHONE ENCOUNTER
Please review.  Protocol failed/has no protocol.    Recent fills each # 30 : 05/28/2025,05/14/2025 for 15 day supply   Last prescription written: 05/27/2025  Last office visit:  4/10/2025    No future appointments.

## 2025-06-12 RX ORDER — ALPRAZOLAM 1 MG/1
1 TABLET ORAL 2 TIMES DAILY PRN
Qty: 60 TABLET | Refills: 0 | Status: SHIPPED | OUTPATIENT
Start: 2025-06-12

## 2025-06-12 NOTE — TELEPHONE ENCOUNTER
Dr. Katz, please advise on refill (patient requesting 30-day supply of 60 tablets) Pended for review for 30-day supply quantity 60 as mentioned in last office visit note, please review. Thank you.    Spoke to Asif  (name and  of patient verified). He reports patient is calling regarding refill request. Patient reports taking alprazolam 1 mg two times daily. Would like 1 month supply of medication if possible.  Per chart review alprazolam refill was refused stating refill is inappropriate. However, today is day 15 for 15-day supply. Will return to primary care provider for review.    Refused:    Disp Refills Start End   ALPRAZolam 1 MG Oral Tab 30 tablet 0 2025 8/10/2025   Sig - Route: Take 1 tablet (1 mg total) by mouth 2 (two) times daily as needed. - Oral   Class: Normal   JORGE: No   Reason for Refusal: Refill not appropriate   Refused By: Shubham Katz, DO     Per medication Dispense History, only 30 tablets (15-day supply) have been dispensed for each order:      Per progress note by Dr. Katz 4/10/25, alprazolam sig is twice daily as needed, Q#60, but order has only been for Q#30:  3. Anxiety  The following sedative has been prescribed and the new Sigg is twice daily as needed with a quantity of 60.  A second prescription has been sent to the pharmacy to negate the first 1.  - Naloxone HCl 4 MG/0.1ML Nasal Liquid; 4 mg by Intranasal route as needed (May repeat as needed in other nostril if symptoms persist). If patient remains unresponsive, repeat dose in other nostril 2-5 minutes after first dose.  Dispense: 1 kit; Refill: 0  - ALPRAZolam 1 MG Oral Tab; Take 1 tablet (1 mg total) by mouth 2 (two) times daily as needed.  Dispense: 30 tablet; Refill: 0

## 2025-06-12 NOTE — TELEPHONE ENCOUNTER
Prescription has been reviewed and completed and signed and sent.  Please call the patient and let her know.

## 2025-06-12 NOTE — TELEPHONE ENCOUNTER
Patient contacted (name and  of patient verified). Dr. Katz's message and prescription details reviewed. Patient verbalizes understanding; denies further questions and agrees with plan of care.

## 2025-07-10 ENCOUNTER — TELEPHONE (OUTPATIENT)
Dept: FAMILY MEDICINE CLINIC | Facility: CLINIC | Age: 40
End: 2025-07-10

## 2025-07-10 DIAGNOSIS — Z63.4 BEREAVEMENT: Primary | ICD-10-CM

## 2025-07-10 DIAGNOSIS — F41.9 ANXIETY: ICD-10-CM

## 2025-07-10 SDOH — SOCIAL STABILITY - SOCIAL INSECURITY: DISSAPEARANCE AND DEATH OF FAMILY MEMBER: Z63.4

## 2025-07-10 NOTE — TELEPHONE ENCOUNTER
Patient is requesting refill for ALPRAZolam 1 MG Oral Tab         [facility] #[id number] - [place], [place] - [address] AT [facility] [address] [facility] [address] [phone], [phone]

## 2025-07-11 NOTE — TELEPHONE ENCOUNTER
Please review.  Protocol failed/has no protocol.    Recent fills each # 60 : 06/12/2025,05/28/2025  Last prescription written: 06/12/2025  Last office visit:  4/10/2025    No future appointments.

## 2025-07-12 NOTE — TELEPHONE ENCOUNTER
The patient called asking for an update on this medication - pended for approval. Dr. Katz please advise, thank you.

## 2025-07-13 RX ORDER — ALPRAZOLAM 1 MG/1
1 TABLET ORAL 2 TIMES DAILY PRN
Qty: 60 TABLET | Refills: 0 | OUTPATIENT
Start: 2025-07-13

## 2025-07-14 NOTE — TELEPHONE ENCOUNTER
Spoke with patient, Date of Birth verified  Pt stated she is looking for med ref, Rx pended.   She is out of meds.   pls advise, thanks in advance.         Requested Prescriptions     Refused Prescriptions Disp Refills    ALPRAZolam 1 MG Oral Tab 60 tablet 0     Sig: Take 1 tablet (1 mg total) by mouth 2 (two) times daily as needed.     Refused By: KAYY REED     Reason for Refusal: Refill not appropriate       Last Office Visit with PCP: 4/10/2025

## 2025-07-15 RX ORDER — ALPRAZOLAM 1 MG/1
1 TABLET ORAL NIGHTLY PRN
Qty: 30 TABLET | Refills: 0 | Status: SHIPPED | OUTPATIENT
Start: 2025-07-15

## 2025-07-15 NOTE — TELEPHONE ENCOUNTER
The patient called again, asking for an update. She said she has been having a lot of anxiety lately as she had a death in the family. Dr. Katz please advise, thank you.

## 2025-07-16 NOTE — TELEPHONE ENCOUNTER
Please note    Francois DEGROOT Em Rn Triage (supporting You)19 minutes ago (11:43 AM)       But I take one in the morning and 1 at night because I have them when I wake up and before I go to bed

## 2025-07-16 NOTE — TELEPHONE ENCOUNTER
A quantity of 30 tablets has been sent to the pharmacy for alprazolam 1 mg to be taken as needed nightly.  You can let the patient know.

## 2025-07-17 NOTE — TELEPHONE ENCOUNTER
The patient will seek out a psychiatrist or we will assign her 1 to manage her sedative medication and also and more importantly help manage her mental health challenge.
